# Patient Record
Sex: MALE | Race: WHITE | NOT HISPANIC OR LATINO | Employment: FULL TIME | URBAN - METROPOLITAN AREA
[De-identification: names, ages, dates, MRNs, and addresses within clinical notes are randomized per-mention and may not be internally consistent; named-entity substitution may affect disease eponyms.]

---

## 2023-07-19 LAB
ALANINE AMINOTRANSFERASE (SGPT) (U/L) IN SER/PLAS: 51 U/L (ref 10–52)
ALBUMIN (G/DL) IN SER/PLAS: 4.5 G/DL (ref 3.4–5)
ALKALINE PHOSPHATASE (U/L) IN SER/PLAS: 61 U/L (ref 33–136)
ANION GAP IN SER/PLAS: 10 MMOL/L (ref 10–20)
ASPARTATE AMINOTRANSFERASE (SGOT) (U/L) IN SER/PLAS: 39 U/L (ref 9–39)
BILIRUBIN TOTAL (MG/DL) IN SER/PLAS: 1.1 MG/DL (ref 0–1.2)
CALCIUM (MG/DL) IN SER/PLAS: 9.8 MG/DL (ref 8.6–10.3)
CARBON DIOXIDE, TOTAL (MMOL/L) IN SER/PLAS: 32 MMOL/L (ref 21–32)
CHLORIDE (MMOL/L) IN SER/PLAS: 104 MMOL/L (ref 98–107)
CHOLESTEROL (MG/DL) IN SER/PLAS: 153 MG/DL (ref 0–199)
CHOLESTEROL IN HDL (MG/DL) IN SER/PLAS: 55.8 MG/DL
CHOLESTEROL/HDL RATIO: 2.7
CREATININE (MG/DL) IN SER/PLAS: 1.09 MG/DL (ref 0.5–1.3)
GFR MALE: 77 ML/MIN/1.73M2
GLUCOSE (MG/DL) IN SER/PLAS: 110 MG/DL (ref 74–99)
LDL: 80 MG/DL (ref 0–99)
POTASSIUM (MMOL/L) IN SER/PLAS: 4.3 MMOL/L (ref 3.5–5.3)
PROSTATE SPECIFIC AG (NG/ML) IN SER/PLAS: 1.02 NG/ML (ref 0–4)
PROTEIN TOTAL: 7.2 G/DL (ref 6.4–8.2)
SODIUM (MMOL/L) IN SER/PLAS: 142 MMOL/L (ref 136–145)
TRIGLYCERIDE (MG/DL) IN SER/PLAS: 88 MG/DL (ref 0–149)
UREA NITROGEN (MG/DL) IN SER/PLAS: 14 MG/DL (ref 6–23)
VLDL: 18 MG/DL (ref 0–40)

## 2023-07-25 ENCOUNTER — PATIENT MESSAGE (OUTPATIENT)
Dept: PRIMARY CARE | Facility: CLINIC | Age: 63
End: 2023-07-25
Payer: COMMERCIAL

## 2023-09-29 ENCOUNTER — HOSPITAL ENCOUNTER (OUTPATIENT)
Facility: HOSPITAL | Age: 63
Setting detail: OUTPATIENT SURGERY
End: 2023-09-29
Attending: SURGERY | Admitting: SURGERY
Payer: COMMERCIAL

## 2023-10-05 ENCOUNTER — APPOINTMENT (OUTPATIENT)
Dept: PRIMARY CARE | Facility: CLINIC | Age: 63
End: 2023-10-05
Payer: COMMERCIAL

## 2023-10-07 PROBLEM — R10.9 ABDOMINAL PAIN: Status: ACTIVE | Noted: 2023-10-07

## 2023-10-07 PROBLEM — E78.5 HYPERLIPIDEMIA: Status: ACTIVE | Noted: 2023-10-07

## 2023-10-07 PROBLEM — D22.5 MELANOCYTIC NEVI OF TRUNK: Status: ACTIVE | Noted: 2023-03-30

## 2023-10-07 PROBLEM — L71.9 ROSACEA, UNSPECIFIED: Status: ACTIVE | Noted: 2023-03-30

## 2023-10-07 PROBLEM — J32.2 CHRONIC ETHMOIDAL SINUSITIS: Status: ACTIVE | Noted: 2023-10-07

## 2023-10-07 PROBLEM — R53.83 FATIGUE: Status: ACTIVE | Noted: 2023-10-07

## 2023-10-07 PROBLEM — D22.70 MELANOCYTIC NEVI OF UNSPECIFIED LOWER LIMB, INCLUDING HIP: Status: ACTIVE | Noted: 2023-03-30

## 2023-10-07 PROBLEM — J34.89 RHINORRHEA: Status: ACTIVE | Noted: 2023-10-07

## 2023-10-07 PROBLEM — L57.9 SKIN CHANGES DUE TO CHRONIC EXPOSURE TO NONIONIZING RADIATION, UNSPECIFIED: Status: ACTIVE | Noted: 2023-03-30

## 2023-10-07 PROBLEM — R14.0 BLOATING: Status: ACTIVE | Noted: 2023-10-07

## 2023-10-07 PROBLEM — L82.1 OTHER SEBORRHEIC KERATOSIS: Status: ACTIVE | Noted: 2023-03-30

## 2023-10-07 PROBLEM — K21.9 GERD (GASTROESOPHAGEAL REFLUX DISEASE): Status: ACTIVE | Noted: 2023-10-07

## 2023-10-07 PROBLEM — D22.39 MELANOCYTIC NEVI OF OTHER PARTS OF FACE: Status: ACTIVE | Noted: 2023-03-30

## 2023-10-07 PROBLEM — M76.62 ACHILLES TENDINITIS OF LEFT LOWER EXTREMITY: Status: ACTIVE | Noted: 2023-10-07

## 2023-10-07 PROBLEM — K22.70 BARRETT'S ESOPHAGUS DETERMINED BY BIOPSY: Status: ACTIVE | Noted: 2023-10-07

## 2023-10-07 PROBLEM — R44.8 FACIAL PRESSURE: Status: ACTIVE | Noted: 2023-10-07

## 2023-10-07 PROBLEM — D22.4 MELANOCYTIC NEVI OF SCALP AND NECK: Status: ACTIVE | Noted: 2023-03-30

## 2023-10-07 PROBLEM — R00.0 SINUS TACHYCARDIA: Status: ACTIVE | Noted: 2023-10-07

## 2023-10-07 PROBLEM — D22.60 MELANOCYTIC NEVI OF UNSPECIFIED UPPER LIMB, INCLUDING SHOULDER: Status: ACTIVE | Noted: 2023-03-30

## 2023-10-07 PROBLEM — D69.3 CHRONIC ITP (IDIOPATHIC THROMBOCYTOPENIA) (MULTI): Status: ACTIVE | Noted: 2023-10-07

## 2023-10-07 PROBLEM — I25.10 CORONARY ARTERY DISEASE: Status: ACTIVE | Noted: 2023-10-07

## 2023-10-07 PROBLEM — R43.8 DECREASED SENSE OF SMELL: Status: ACTIVE | Noted: 2023-10-07

## 2023-10-07 PROBLEM — R09.89 THROAT CLEARING: Status: ACTIVE | Noted: 2023-10-07

## 2023-10-07 PROBLEM — R94.31 ABNORMAL EKG: Status: ACTIVE | Noted: 2023-10-07

## 2023-10-07 PROBLEM — D48.5 NEOPLASM OF UNCERTAIN BEHAVIOR OF SKIN: Status: ACTIVE | Noted: 2023-03-30

## 2023-10-07 PROBLEM — R26.89 ANTALGIC GAIT: Status: ACTIVE | Noted: 2023-10-07

## 2023-10-07 PROBLEM — L71.0 PERIORAL DERMATITIS: Status: ACTIVE | Noted: 2023-03-30

## 2023-10-07 RX ORDER — OMEPRAZOLE 40 MG/1
1 CAPSULE, DELAYED RELEASE ORAL 2 TIMES DAILY
COMMUNITY
Start: 2023-03-23 | End: 2024-01-19 | Stop reason: HOSPADM

## 2023-10-07 RX ORDER — METRONIDAZOLE 7.5 MG/G
1 CREAM TOPICAL 2 TIMES DAILY
COMMUNITY
Start: 2023-03-30 | End: 2023-11-01

## 2023-10-07 RX ORDER — GUAIFENESIN AND PHENYLEPHRINE HCL 400; 10 MG/1; MG/1
1 TABLET ORAL DAILY
COMMUNITY
Start: 2019-12-05 | End: 2023-11-01

## 2023-10-07 RX ORDER — PANTOPRAZOLE SODIUM 40 MG/1
40 TABLET, DELAYED RELEASE ORAL DAILY
COMMUNITY
Start: 2023-02-03 | End: 2023-11-01

## 2023-10-07 RX ORDER — ZINC SULFATE 50(220)MG
CAPSULE ORAL
COMMUNITY

## 2023-10-07 RX ORDER — SODIUM, POTASSIUM,MAG SULFATES 17.5-3.13G
SOLUTION, RECONSTITUTED, ORAL ORAL
COMMUNITY
Start: 2022-02-24 | End: 2023-11-01

## 2023-10-07 RX ORDER — LORATADINE 10 MG/1
CAPSULE, LIQUID FILLED ORAL
COMMUNITY
Start: 2022-12-16 | End: 2024-01-16 | Stop reason: ALTCHOICE

## 2023-10-07 RX ORDER — ATORVASTATIN CALCIUM 10 MG/1
1 TABLET, FILM COATED ORAL
COMMUNITY
Start: 2018-04-23 | End: 2023-10-30 | Stop reason: SDUPTHER

## 2023-10-07 RX ORDER — LANOLIN ALCOHOL/MO/W.PET/CERES
CREAM (GRAM) TOPICAL
COMMUNITY
Start: 2019-12-05

## 2023-10-07 RX ORDER — AMOXICILLIN AND CLAVULANATE POTASSIUM 875; 125 MG/1; MG/1
1 TABLET, FILM COATED ORAL 2 TIMES DAILY
COMMUNITY
End: 2023-11-01

## 2023-10-07 RX ORDER — DICYCLOMINE HYDROCHLORIDE 10 MG/1
10 CAPSULE ORAL EVERY 6 HOURS PRN
COMMUNITY
Start: 2023-02-06 | End: 2023-11-01

## 2023-10-07 RX ORDER — CHOLECALCIFEROL (VITAMIN D3) 125 MCG
CAPSULE ORAL
COMMUNITY
Start: 2019-12-05

## 2023-10-09 ENCOUNTER — CLINICAL SUPPORT (OUTPATIENT)
Dept: ALLERGY | Facility: CLINIC | Age: 63
End: 2023-10-09
Payer: COMMERCIAL

## 2023-10-09 DIAGNOSIS — J30.9 ALLERGIC RHINITIS, UNSPECIFIED SEASONALITY, UNSPECIFIED TRIGGER: ICD-10-CM

## 2023-10-09 PROCEDURE — 95115 IMMUNOTHERAPY ONE INJECTION: CPT | Performed by: ALLERGY & IMMUNOLOGY

## 2023-10-23 ENCOUNTER — APPOINTMENT (OUTPATIENT)
Dept: ALLERGY | Facility: CLINIC | Age: 63
End: 2023-10-23
Payer: COMMERCIAL

## 2023-10-27 ENCOUNTER — TELEPHONE (OUTPATIENT)
Dept: PRIMARY CARE | Facility: CLINIC | Age: 63
End: 2023-10-27
Payer: COMMERCIAL

## 2023-10-27 NOTE — TELEPHONE ENCOUNTER
Pt was your pt. Years ago & is coming as npv in January.  Has sinus problems  & wondered if you could get him in earlier.  Please advise  Ty

## 2023-10-28 ENCOUNTER — TELEPHONE (OUTPATIENT)
Dept: SURGERY | Facility: CLINIC | Age: 63
End: 2023-10-28
Payer: COMMERCIAL

## 2023-10-28 NOTE — TELEPHONE ENCOUNTER
GEN SURG: Called received VM, apologized for delay in returning call - asked to call me back r/t his statement that has couple questions to speak with Dr. Roth about prior to scheduled LINX 11/30. Will determine if needs pre-op FUV with Dr. Roth or if question(s) can be answered via phone call with LPN or Fellow. *Pending return call from jean carlos Hardy my direct number:  [phone].

## 2023-10-30 ENCOUNTER — TELEPHONE (OUTPATIENT)
Dept: ALLERGY | Facility: CLINIC | Age: 63
End: 2023-10-30
Payer: COMMERCIAL

## 2023-10-30 ENCOUNTER — PATIENT MESSAGE (OUTPATIENT)
Dept: ALLERGY | Facility: CLINIC | Age: 63
End: 2023-10-30
Payer: COMMERCIAL

## 2023-10-30 DIAGNOSIS — D22.4 MELANOCYTIC NEVI OF SCALP AND NECK: ICD-10-CM

## 2023-10-30 DIAGNOSIS — E78.5 HYPERLIPIDEMIA, UNSPECIFIED HYPERLIPIDEMIA TYPE: ICD-10-CM

## 2023-10-30 RX ORDER — ATORVASTATIN CALCIUM 10 MG/1
10 TABLET, FILM COATED ORAL
Qty: 36 TABLET | Refills: 3 | Status: SHIPPED | OUTPATIENT
Start: 2023-10-30 | End: 2024-05-08 | Stop reason: ALTCHOICE

## 2023-11-01 ENCOUNTER — OFFICE VISIT (OUTPATIENT)
Dept: PRIMARY CARE | Facility: CLINIC | Age: 63
End: 2023-11-01
Payer: COMMERCIAL

## 2023-11-01 VITALS
TEMPERATURE: 96.9 F | BODY MASS INDEX: 27.68 KG/M2 | OXYGEN SATURATION: 97 % | WEIGHT: 204.4 LBS | SYSTOLIC BLOOD PRESSURE: 120 MMHG | HEIGHT: 72 IN | HEART RATE: 74 BPM | DIASTOLIC BLOOD PRESSURE: 72 MMHG

## 2023-11-01 DIAGNOSIS — E78.5 HYPERLIPIDEMIA, UNSPECIFIED HYPERLIPIDEMIA TYPE: ICD-10-CM

## 2023-11-01 DIAGNOSIS — I25.10 CORONARY ARTERY DISEASE INVOLVING NATIVE HEART WITHOUT ANGINA PECTORIS, UNSPECIFIED VESSEL OR LESION TYPE: ICD-10-CM

## 2023-11-01 DIAGNOSIS — D69.3 CHRONIC ITP (IDIOPATHIC THROMBOCYTOPENIA) (MULTI): ICD-10-CM

## 2023-11-01 DIAGNOSIS — K21.9 GASTROESOPHAGEAL REFLUX DISEASE, UNSPECIFIED WHETHER ESOPHAGITIS PRESENT: ICD-10-CM

## 2023-11-01 DIAGNOSIS — J01.90 ACUTE SINUSITIS, RECURRENCE NOT SPECIFIED, UNSPECIFIED LOCATION: Primary | ICD-10-CM

## 2023-11-01 PROCEDURE — 1036F TOBACCO NON-USER: CPT | Performed by: FAMILY MEDICINE

## 2023-11-01 PROCEDURE — 99214 OFFICE O/P EST MOD 30 MIN: CPT | Performed by: FAMILY MEDICINE

## 2023-11-01 RX ORDER — LEVOFLOXACIN 500 MG/1
500 TABLET, FILM COATED ORAL DAILY
Qty: 10 TABLET | Refills: 0 | Status: SHIPPED | OUTPATIENT
Start: 2023-11-01 | End: 2023-11-11

## 2023-11-01 RX ORDER — FLUTICASONE PROPIONATE 50 MCG
1 SPRAY, SUSPENSION (ML) NASAL DAILY
COMMUNITY
End: 2023-12-05

## 2023-11-01 RX ORDER — PREDNISONE 20 MG/1
20 TABLET ORAL DAILY
Qty: 7 TABLET | Refills: 0 | Status: SHIPPED | OUTPATIENT
Start: 2023-11-01 | End: 2023-11-08

## 2023-11-01 ASSESSMENT — ENCOUNTER SYMPTOMS: DEPRESSION: 0

## 2023-11-01 ASSESSMENT — PAIN SCALES - GENERAL: PAINLEVEL: 2

## 2023-11-01 NOTE — PROGRESS NOTES
"Subjective   Patient ID: Antony Pizarro is a 63 y.o. male who presents for New Patient Visit (Establish care with pcp, pt is not fasting. ) and Sinus Problem (Sinus concerns for 3 months now. Medication are not helping. ).    HPI   July 4th was at [facility]. Treated with amox/clav for sinus infx.  Took 2 courses, 10 days each, would get better then return.  Still with sinus drainage.  Having LINX for GERD at end of month.    Review of Systems  Cardiovascular: no chest pain, no edema, no palpitations, and no syncope.   Respiratory: no cough,no shortness of breath, and no wheezing  Gastrointestinal: no abdominal pain, nausea, vomiting or blood in stools  Genitourinary: no dysuria or hematuria  Objective   /72 (BP Location: Left arm, Patient Position: Sitting, BP Cuff Size: Adult)   Pulse 74   Temp 36.1 °C (96.9 °F) (Temporal)   Ht 1.818 m (5' 11.58\")   Wt 92.7 kg (204 lb 6.4 oz)   SpO2 97%   BMI 28.05 kg/m²     Physical Exam  Alert, pleasant and in no acute distress.  HEENT: Normocephalic, atraumatic.  Ears: Canals clear.  Tympanic membranes intact and pearly gray.  Nose: Nares reveal mildly inflamed turbinates.  Mouth: No mucosal lesions noted.  No pharyngeal erythema.  Neck: Supple.  No palpable lymphadenopathy.  No JVD or bruit  Heart: Regular rate and rhythm without murmur  Lungs: Clear to auscultation    Assessment/Plan   Problem List Items Addressed This Visit             ICD-10-CM       Cardiac and Vasculature    Coronary artery disease I25.10    Hyperlipidemia E78.5       Coag and Thromboembolic    Chronic ITP (idiopathic thrombocytopenia) (CMS/[facility]) D69.3       Gastrointestinal and Abdominal    GERD (gastroesophageal reflux disease) K21.9     Other Visit Diagnoses         Codes    Acute sinusitis, recurrence not specified, unspecified location    -  Primary J01.90    Relevant Medications    levoFLOXacin (Levaquin) 500 mg tablet    predniSONE (Deltasone) 20 mg tablet        Patient here to " establish.  He is a former patient.  Medical records reviewed.  1.  Acute sinusitis: Patient with a persisting sinusitis.  Antibiotics seem to give him relief but slowly things recur.  We will treat with 10 days of Levaquin along with 7 days of prednisone.  If things persist or recur, would refer to ENT and get a CT of the sinus.  2.  CAD/HLD: Under good control  3.  Chronic ITP: Stable  4.  GERD: Patient planning Lynx procedure at the end of the month.

## 2023-11-03 DIAGNOSIS — K21.9 GASTROESOPHAGEAL REFLUX DISEASE, UNSPECIFIED WHETHER ESOPHAGITIS PRESENT: ICD-10-CM

## 2023-11-03 PROBLEM — Z01.818 PREOPERATIVE CLEARANCE: Status: ACTIVE | Noted: 2023-11-03

## 2023-11-03 NOTE — PROGRESS NOTES
Client scheduled for Lap HHR, Poss. LES Augmentation & EGD on 11/30/23 - Dr. Roth orders following Pre-Op:  Labs - Chest Xray - EKG (Last Completed 5/11/23 results in Epic).  Orders entered & may be completed at any time from now up until 2 weeks prior to scheduled surgical  procedure.  Also clearance notes from Cardiology and Primary Care Provider stating medically cleared to procedure with surgery.

## 2023-11-06 ENCOUNTER — CLINICAL SUPPORT (OUTPATIENT)
Dept: ALLERGY | Facility: CLINIC | Age: 63
End: 2023-11-06
Payer: COMMERCIAL

## 2023-11-06 DIAGNOSIS — J30.9 ALLERGIC RHINITIS, UNSPECIFIED SEASONALITY, UNSPECIFIED TRIGGER: ICD-10-CM

## 2023-11-06 PROCEDURE — 95115 IMMUNOTHERAPY ONE INJECTION: CPT | Performed by: ALLERGY & IMMUNOLOGY

## 2023-11-21 ENCOUNTER — TELEPHONE (OUTPATIENT)
Dept: PRIMARY CARE | Facility: CLINIC | Age: 63
End: 2023-11-21
Payer: COMMERCIAL

## 2023-11-21 DIAGNOSIS — H92.09 OTALGIA, UNSPECIFIED LATERALITY: Primary | ICD-10-CM

## 2023-11-21 NOTE — TELEPHONE ENCOUNTER
Pt. Was in & took meds to treat ear drainage.  Still having problems.  Could he please have referral to ent?  Please advise  Ty

## 2023-11-22 DIAGNOSIS — J01.90 ACUTE SINUSITIS, RECURRENCE NOT SPECIFIED, UNSPECIFIED LOCATION: Primary | ICD-10-CM

## 2023-11-22 RX ORDER — CEPHALEXIN 500 MG/1
500 CAPSULE ORAL 3 TIMES DAILY
Qty: 21 CAPSULE | Refills: 0 | Status: SHIPPED | OUTPATIENT
Start: 2023-11-22 | End: 2023-11-29

## 2023-11-28 ENCOUNTER — ANCILLARY ORDERS (OUTPATIENT)
Dept: SURGERY | Facility: CLINIC | Age: 63
End: 2023-11-28

## 2023-11-28 ENCOUNTER — ANCILLARY PROCEDURE (OUTPATIENT)
Dept: RADIOLOGY | Facility: CLINIC | Age: 63
End: 2023-11-28
Payer: COMMERCIAL

## 2023-11-28 DIAGNOSIS — K21.9 GASTROESOPHAGEAL REFLUX DISEASE, UNSPECIFIED WHETHER ESOPHAGITIS PRESENT: ICD-10-CM

## 2023-11-28 PROCEDURE — 71046 X-RAY EXAM CHEST 2 VIEWS: CPT | Mod: FY

## 2023-11-28 PROCEDURE — 95165 ANTIGEN THERAPY SERVICES: CPT | Performed by: ALLERGY & IMMUNOLOGY

## 2023-11-28 PROCEDURE — 71046 X-RAY EXAM CHEST 2 VIEWS: CPT | Performed by: RADIOLOGY

## 2023-11-29 ENCOUNTER — ANESTHESIA EVENT (OUTPATIENT)
Dept: OPERATING ROOM | Facility: HOSPITAL | Age: 63
End: 2023-11-29

## 2023-11-29 RX ORDER — DROPERIDOL 2.5 MG/ML
0.62 INJECTION, SOLUTION INTRAMUSCULAR; INTRAVENOUS ONCE AS NEEDED
Status: CANCELLED | OUTPATIENT
Start: 2023-11-29

## 2023-11-29 RX ORDER — SODIUM CHLORIDE, SODIUM LACTATE, POTASSIUM CHLORIDE, CALCIUM CHLORIDE 600; 310; 30; 20 MG/100ML; MG/100ML; MG/100ML; MG/100ML
100 INJECTION, SOLUTION INTRAVENOUS CONTINUOUS
Status: CANCELLED | OUTPATIENT
Start: 2023-11-29

## 2023-11-29 RX ORDER — ONDANSETRON HYDROCHLORIDE 2 MG/ML
4 INJECTION, SOLUTION INTRAVENOUS ONCE AS NEEDED
Status: CANCELLED | OUTPATIENT
Start: 2023-11-29

## 2023-11-29 RX ORDER — OXYCODONE HYDROCHLORIDE 5 MG/1
5 TABLET ORAL EVERY 4 HOURS PRN
Status: CANCELLED | OUTPATIENT
Start: 2023-11-29

## 2023-11-30 ENCOUNTER — ANESTHESIA (OUTPATIENT)
Dept: OPERATING ROOM | Facility: HOSPITAL | Age: 63
End: 2023-11-30
Payer: COMMERCIAL

## 2023-11-30 ENCOUNTER — EDUCATION (OUTPATIENT)
Dept: ALLERGY | Facility: CLINIC | Age: 63
End: 2023-11-30
Payer: COMMERCIAL

## 2023-11-30 DIAGNOSIS — J30.9 ALLERGIC RHINITIS, UNSPECIFIED SEASONALITY, UNSPECIFIED TRIGGER: Primary | ICD-10-CM

## 2023-12-05 ENCOUNTER — APPOINTMENT (OUTPATIENT)
Dept: ALLERGY | Facility: CLINIC | Age: 63
End: 2023-12-05
Payer: COMMERCIAL

## 2023-12-05 ENCOUNTER — OFFICE VISIT (OUTPATIENT)
Dept: OTOLARYNGOLOGY | Facility: CLINIC | Age: 63
End: 2023-12-05
Payer: COMMERCIAL

## 2023-12-05 VITALS
TEMPERATURE: 97.3 F | WEIGHT: 204 LBS | BODY MASS INDEX: 27.63 KG/M2 | HEIGHT: 72 IN | DIASTOLIC BLOOD PRESSURE: 78 MMHG | HEART RATE: 75 BPM | SYSTOLIC BLOOD PRESSURE: 137 MMHG

## 2023-12-05 DIAGNOSIS — J32.4 CHRONIC PANSINUSITIS: ICD-10-CM

## 2023-12-05 DIAGNOSIS — H92.09 OTALGIA, UNSPECIFIED LATERALITY: ICD-10-CM

## 2023-12-05 DIAGNOSIS — J30.89 ALLERGIC RHINITIS DUE TO OTHER ALLERGIC TRIGGER, UNSPECIFIED SEASONALITY: ICD-10-CM

## 2023-12-05 DIAGNOSIS — J34.89 RHINORRHEA: ICD-10-CM

## 2023-12-05 PROCEDURE — 1036F TOBACCO NON-USER: CPT | Performed by: STUDENT IN AN ORGANIZED HEALTH CARE EDUCATION/TRAINING PROGRAM

## 2023-12-05 PROCEDURE — 31231 NASAL ENDOSCOPY DX: CPT | Performed by: STUDENT IN AN ORGANIZED HEALTH CARE EDUCATION/TRAINING PROGRAM

## 2023-12-05 PROCEDURE — 99213 OFFICE O/P EST LOW 20 MIN: CPT | Performed by: STUDENT IN AN ORGANIZED HEALTH CARE EDUCATION/TRAINING PROGRAM

## 2023-12-05 PROCEDURE — 99203 OFFICE O/P NEW LOW 30 MIN: CPT | Performed by: STUDENT IN AN ORGANIZED HEALTH CARE EDUCATION/TRAINING PROGRAM

## 2023-12-05 RX ORDER — FLUTICASONE PROPIONATE 50 MCG
1 SPRAY, SUSPENSION (ML) NASAL 2 TIMES DAILY
Qty: 16 G | Refills: 6 | Status: SHIPPED | OUTPATIENT
Start: 2023-12-05 | End: 2024-12-04

## 2023-12-05 RX ORDER — AZELASTINE 1 MG/ML
1 SPRAY, METERED NASAL 2 TIMES DAILY
Qty: 30 ML | Refills: 6 | Status: SHIPPED | OUTPATIENT
Start: 2023-12-05 | End: 2024-12-04

## 2023-12-05 ASSESSMENT — COLUMBIA-SUICIDE SEVERITY RATING SCALE - C-SSRS
6. HAVE YOU EVER DONE ANYTHING, STARTED TO DO ANYTHING, OR PREPARED TO DO ANYTHING TO END YOUR LIFE?: NO
2. HAVE YOU ACTUALLY HAD ANY THOUGHTS OF KILLING YOURSELF?: NO
1. IN THE PAST MONTH, HAVE YOU WISHED YOU WERE DEAD OR WISHED YOU COULD GO TO SLEEP AND NOT WAKE UP?: NO

## 2023-12-05 ASSESSMENT — PATIENT HEALTH QUESTIONNAIRE - PHQ9
1. LITTLE INTEREST OR PLEASURE IN DOING THINGS: NOT AT ALL
2. FEELING DOWN, DEPRESSED OR HOPELESS: NOT AT ALL
SUM OF ALL RESPONSES TO PHQ9 QUESTIONS 1 AND 2: 0

## 2023-12-05 ASSESSMENT — PAIN SCALES - GENERAL: PAINLEVEL: 0-NO PAIN

## 2023-12-05 NOTE — PROGRESS NOTES
"SUBJECTIVE  Patient ID: Antony Pizarro is a 63 y.o. male who presents for New Patient Visit (Possible sinus infection).    He reports longstanding sinus issues. Last had surgery 2020 with my colleague, Dr. Moore.  Reports that after surgery he continued to have symptoms which she treated with multiple different medications.  Currently working with allergy and has noted some \"marginal\" improvement with treatment of dust mite IT.  Previously had workup for possible immune deficiency which was normal.    Caught an infection July 2023. Was then treated with a string of antibiotics (now been on 5 courses of antibiotics - cephalexin, Levaquin, amoxicillin-clav; no steroids - didn't take because of active levofloxacin prescription). Not currently on any nasal sprays was previously on generic Flonase. Notes some improvement in nasal discharge while on antibiotics; then thicker discharge comes up. Had sun sensitivity with doxycycline. Irrigates frequently.     They endorse nasal congestion, nasal obstruction, nasal drainage, and facial pressure. Nasal obstruction is worse at night. Reports very poor sense of smell. They deny epistaxis. Had some dry blood while on Keflex recently. History of trauma playing football with broken nose; had reduction and septoplasty. Previously tried saline spray, saline irrigations, steroid spray (Flonase, Ryaltris), antihistamine spray, oral antihistamine (Claritin), and montelukast.     Has a history of significant GERD; gets a \"lot of regurgitation.\" Trying a new LINK procedure soon (had to cancel planned procedure secondary to the influenza).    Review of Systems  Complete ROS negative except as noted above or on patient intake form and as above.    OBJECTIVE  Physical Exam  CONSTITUTIONAL: Well appearing male who appears stated age.  PSYCHIATRIC: Alert, appropriate mood and affect.  RESPIRATORY: Normal inspiration and expiration and chest wall expansion; no use of accessory muscles to " breathe.  VOICE: Clear speech without hoarseness. No stridor nor stertor.  HEAD, FACE, AND SKIN: Symmetric facial feature. No cutaneous masses or lesions were visualized. The parotid and submandibular glands were normal to palpation.  EYES: Pupils were equal in size and reactive to light. Extra-ocular muscle function was intact. No nystagmus was observed. Vision was grossly intact.  EARS: External ears were normally formed with no lesions. The external auditory canals were clear. The tympanic membranes were intact and in the neutral position. No significant retraction pockets nor effusions were appreciated.  NOSE: Nasal dorsum was midline. Anterior rhinoscopy demonstrated a septum relatively midline. Inferior turbinates were mildly hypertrophied. No obvious nasal masses, polyps, mucopurulence, nor other lesions were appreciated.  ORAL CAVITY: Lips were without lesions. Moist mucous membranes. No lesions appreciated along the gingiva, oral mucosa, nor tongue.  DENTITION: Grossly normal without obvious infection nor inflammation.  OROPHARYNX: No lesion nor mucosal abnormality. The uvula was normal appearing. The tonsils were surgically absent.  NECK: Visualization and palpation of the neck revealed no mass lesions, no thyromegaly or thyroid masses. No cutaneous lesions appreciated.  LYMPHATICS (CERVICAL): There were no palpable lymph nodes in the posterior triangle, submandibular triangle, jugulodigastric region, nor central neck.  NEUROLOGIC: Cranial nerves III, IV, and VI were noted to be intact via extra-ocular muscle movement testing. Cranial nerve V was noted to be intact to soft touch bilaterally. Cranial nerve VII was noted to be intact and symmetric by facial movement. Cranial nerve VIII was tested with normal voice examination and revealed grossly normal hearing. Cranial nerves IX and X noted to be intact by palatal movement. Cranial nerve XI noted to be intact via shoulder shrug.  Cranial nerve XII noted to  be intact with active and symmetric tongue movement.      Radiology  CT sinus 2019: I personally reviewed the patient's prior imaging.  This demonstrates evidence of limited sinus surgery involving the right maxillary sinus and anterior ethmoid air cells.  There was varying levels of mucosal thickening involving the paranasal sinuses.    --------------------------------------------------  Procedure: Nasal endoscopy - Diagnostic  Indication: Chronic rhinitis, concern for chronic sinusitis  Informed consent verbally obtained: The risks, benefits, alternatives, and expectations were discussed with the patient, who wished to proceed  Anesthesia: Topical lidocaine/oxymetazoline    Findings: After topical anesthetic was applied the nasal cavities were examined with a flexible endoscope. The septum was relatively midline.  - Right: The inferior turbinate was moderately hypertrophied.  Clear drainage appreciated tracking along the inferior meatus to the nasopharynx.  The middle turbinate appeared healthy.  There is evidence of prior widely patent maxillary antrostomy with questionable scarring along the floor/anterior wall.  The ethmoid sinuses are clear to the skull base with widely patent frontal sinus outflow tract.  The sphenoid sinus is widely patent.  - Left: The inferior turbinate was moderately hypertrophied.  Thicker mucoid drainage appreciated tracking along the inferior meatus to the nasopharynx.  The middle turbinate appeared healthy.  There is evidence of prior surgery with widely patent maxillary antrostomy, ethmoid sinuses, sphenoid os, and frontal sinus outflow tract.    There is bilateral symmetric true vocal fold motion.  No pharyngeal/pharyngeal lesions nor masses.    There were no complications and the patient tolerated the procedure well.  --------------------------------------------------    ASSESSMENT/PLAN  Diagnoses and all orders for this visit:  Otalgia, unspecified laterality  -     Referral to  ENT  Rhinorrhea  Allergic rhinitis due to other allergic trigger, unspecified seasonality  -     azelastine (Astelin) 137 mcg (0.1 %) nasal spray; Administer 1 spray into each nostril 2 times a day. Use in each nostril as directed  Chronic pansinusitis  -     CT sinuss wo IV contrast volumetric surgical planning; Future  -     fluticasone (Flonase) 50 mcg/actuation nasal spray; Administer 1 spray into each nostril 2 times a day. Shake gently. Before first use, prime pump. After use, clean tip and replace cap.    63 y.o. male with longstanding nasal and sinus concerns partially secondary to allergic rhinitis.    1.  Chronic/allergic rhinitis, history of chronic sinusitis, nasal drainage  The patient reports longstanding difficulty with sinus disease.  He has a history of dust mite allergy and is currently getting immunotherapy.  However, over the last half year he has had increasing thick nasal drainage that has not responded well to multiple rounds of antibiotics.    Endoscopy: Evidence of prior surgery fully healed.  Questionable scarring within the right maxillary sinus.  Thicker drainage within the nasal cavity but no evidence of sinus infection.  Imaging: Ordered    Given how well the patient appears today on endoscopy, I recommended that he restart medical therapy with his irrigations and see if there is any improvement.  I suspect that much of his symptoms are secondary to his allergic rhinitis which I do not believe that we will be able to control surgically.  I am slightly concerned that there may be some scarring within the right maxillary sinus and given his multiple rounds of antibiotics I recommended he obtain a CT sinus to better evaluate this.  I will call him with the results.  In the meantime he was amenable to retrialing medical therapy.  He will continue work with allergy.    This note was created using speech recognition transcription software. Despite proofreading, typographical errors may be  present that affect the meaning of the content. Please contact my office with any questions.

## 2023-12-05 NOTE — LETTER
"December 5, 2023     Ronnie Cardoza DO  [address]  [address]  [facility] [address]    Patient: Antony Pizarro   YOB: 1960   Date of Visit: 12/5/2023       Dear Dr. Ronnie Cardoza DO:    Thank you for referring Antony Pizarro to me for evaluation. Below are my notes for this consultation.  If you have questions, please do not hesitate to call me. I look forward to following your patient along with you.       Sincerely,     Preston De Leon MD      CC: Ramila Dwyer MD  ______________________________________________________________________________________    SUBJECTIVE  Patient ID: Antony Pizarro is a 63 y.o. male who presents for New Patient Visit (Possible sinus infection).    He reports longstanding sinus issues. Last had surgery 2020 with my colleague, Dr. Moore.  Reports that after surgery he continued to have symptoms which she treated with multiple different medications.  Currently working with allergy and has noted some \"marginal\" improvement with treatment of dust mite IT.  Previously had workup for possible immune deficiency which was normal.    Caught an infection July 2023. Was then treated with a string of antibiotics (now been on 5 courses of antibiotics - cephalexin, Levaquin, amoxicillin-clav; no steroids - didn't take because of active levofloxacin prescription). Not currently on any nasal sprays was previously on generic Flonase. Notes some improvement in nasal discharge while on antibiotics; then thicker discharge comes up. Had sun sensitivity with doxycycline. Irrigates frequently.     They endorse nasal congestion, nasal obstruction, nasal drainage, and facial pressure. Nasal obstruction is worse at night. Reports very poor sense of smell. They deny epistaxis. Had some dry blood while on Keflex recently. History of trauma playing football with broken nose; had reduction and septoplasty. Previously tried saline spray, saline irrigations, steroid " "spray (Flonase, Ryaltris), antihistamine spray, oral antihistamine (Claritin), and montelukast.     Has a history of significant GERD; gets a \"lot of regurgitation.\" Trying a new LINK procedure soon (had to cancel planned procedure secondary to the influenza).    Review of Systems  Complete ROS negative except as noted above or on patient intake form and as above.    OBJECTIVE  Physical Exam  CONSTITUTIONAL: Well appearing male who appears stated age.  PSYCHIATRIC: Alert, appropriate mood and affect.  RESPIRATORY: Normal inspiration and expiration and chest wall expansion; no use of accessory muscles to breathe.  VOICE: Clear speech without hoarseness. No stridor nor stertor.  HEAD, FACE, AND SKIN: Symmetric facial feature. No cutaneous masses or lesions were visualized. The parotid and submandibular glands were normal to palpation.  EYES: Pupils were equal in size and reactive to light. Extra-ocular muscle function was intact. No nystagmus was observed. Vision was grossly intact.  EARS: External ears were normally formed with no lesions. The external auditory canals were clear. The tympanic membranes were intact and in the neutral position. No significant retraction pockets nor effusions were appreciated.  NOSE: Nasal dorsum was midline. Anterior rhinoscopy demonstrated a septum relatively midline. Inferior turbinates were mildly hypertrophied. No obvious nasal masses, polyps, mucopurulence, nor other lesions were appreciated.  ORAL CAVITY: Lips were without lesions. Moist mucous membranes. No lesions appreciated along the gingiva, oral mucosa, nor tongue.  DENTITION: Grossly normal without obvious infection nor inflammation.  OROPHARYNX: No lesion nor mucosal abnormality. The uvula was normal appearing. The tonsils were surgically absent.  NECK: Visualization and palpation of the neck revealed no mass lesions, no thyromegaly or thyroid masses. No cutaneous lesions appreciated.  LYMPHATICS (CERVICAL): There were no " palpable lymph nodes in the posterior triangle, submandibular triangle, jugulodigastric region, nor central neck.  NEUROLOGIC: Cranial nerves III, IV, and VI were noted to be intact via extra-ocular muscle movement testing. Cranial nerve V was noted to be intact to soft touch bilaterally. Cranial nerve VII was noted to be intact and symmetric by facial movement. Cranial nerve VIII was tested with normal voice examination and revealed grossly normal hearing. Cranial nerves IX and X noted to be intact by palatal movement. Cranial nerve XI noted to be intact via shoulder shrug.  Cranial nerve XII noted to be intact with active and symmetric tongue movement.      Radiology  CT sinus 2019: I personally reviewed the patient's prior imaging.  This demonstrates evidence of limited sinus surgery involving the right maxillary sinus and anterior ethmoid air cells.  There was varying levels of mucosal thickening involving the paranasal sinuses.    --------------------------------------------------  Procedure: Nasal endoscopy - Diagnostic  Indication: Chronic rhinitis, concern for chronic sinusitis  Informed consent verbally obtained: The risks, benefits, alternatives, and expectations were discussed with the patient, who wished to proceed  Anesthesia: Topical lidocaine/oxymetazoline    Findings: After topical anesthetic was applied the nasal cavities were examined with a flexible endoscope. The septum was relatively midline.  - Right: The inferior turbinate was moderately hypertrophied.  Clear drainage appreciated tracking along the inferior meatus to the nasopharynx.  The middle turbinate appeared healthy.  There is evidence of prior widely patent maxillary antrostomy with questionable scarring along the floor/anterior wall.  The ethmoid sinuses are clear to the skull base with widely patent frontal sinus outflow tract.  The sphenoid sinus is widely patent.  - Left: The inferior turbinate was moderately hypertrophied.  Thicker  mucoid drainage appreciated tracking along the inferior meatus to the nasopharynx.  The middle turbinate appeared healthy.  There is evidence of prior surgery with widely patent maxillary antrostomy, ethmoid sinuses, sphenoid os, and frontal sinus outflow tract.    There is bilateral symmetric true vocal fold motion.  No pharyngeal/pharyngeal lesions nor masses.    There were no complications and the patient tolerated the procedure well.  --------------------------------------------------    ASSESSMENT/PLAN  Diagnoses and all orders for this visit:  Otalgia, unspecified laterality  -     Referral to ENT  Rhinorrhea  Allergic rhinitis due to other allergic trigger, unspecified seasonality  -     azelastine (Astelin) 137 mcg (0.1 %) nasal spray; Administer 1 spray into each nostril 2 times a day. Use in each nostril as directed  Chronic pansinusitis  -     CT sinuss wo IV contrast volumetric surgical planning; Future  -     fluticasone (Flonase) 50 mcg/actuation nasal spray; Administer 1 spray into each nostril 2 times a day. Shake gently. Before first use, prime pump. After use, clean tip and replace cap.    63 y.o. male with longstanding nasal and sinus concerns partially secondary to allergic rhinitis.    1.  Chronic/allergic rhinitis, history of chronic sinusitis, nasal drainage  The patient reports longstanding difficulty with sinus disease.  He has a history of dust mite allergy and is currently getting immunotherapy.  However, over the last half year he has had increasing thick nasal drainage that has not responded well to multiple rounds of antibiotics.    Endoscopy: Evidence of prior surgery fully healed.  Questionable scarring within the right maxillary sinus.  Thicker drainage within the nasal cavity but no evidence of sinus infection.  Imaging: Ordered    Given how well the patient appears today on endoscopy, I recommended that he restart medical therapy with his irrigations and see if there is any  improvement.  I suspect that much of his symptoms are secondary to his allergic rhinitis which I do not believe that we will be able to control surgically.  I am slightly concerned that there may be some scarring within the right maxillary sinus and given his multiple rounds of antibiotics I recommended he obtain a CT sinus to better evaluate this.  I will call him with the results.  In the meantime he was amenable to retrialing medical therapy.  He will continue work with allergy.    This note was created using speech recognition transcription software. Despite proofreading, typographical errors may be present that affect the meaning of the content. Please contact my office with any questions.

## 2023-12-08 ENCOUNTER — CLINICAL SUPPORT (OUTPATIENT)
Dept: ALLERGY | Facility: CLINIC | Age: 63
End: 2023-12-08
Payer: COMMERCIAL

## 2023-12-08 DIAGNOSIS — J30.9 ALLERGIC RHINITIS, UNSPECIFIED SEASONALITY, UNSPECIFIED TRIGGER: ICD-10-CM

## 2023-12-08 PROCEDURE — 95115 IMMUNOTHERAPY ONE INJECTION: CPT | Performed by: ALLERGY & IMMUNOLOGY

## 2023-12-14 PROBLEM — J30.9 ALLERGIC RHINITIS: Status: ACTIVE | Noted: 2023-12-14

## 2023-12-15 ENCOUNTER — OFFICE VISIT (OUTPATIENT)
Dept: ALLERGY | Facility: CLINIC | Age: 63
End: 2023-12-15
Payer: COMMERCIAL

## 2023-12-15 VITALS — BODY MASS INDEX: 27.53 KG/M2 | WEIGHT: 203 LBS | OXYGEN SATURATION: 98 % | HEART RATE: 70 BPM

## 2023-12-15 DIAGNOSIS — J32.2 CHRONIC ETHMOIDAL SINUSITIS: ICD-10-CM

## 2023-12-15 DIAGNOSIS — J30.9 ALLERGIC RHINITIS, UNSPECIFIED SEASONALITY, UNSPECIFIED TRIGGER: Primary | ICD-10-CM

## 2023-12-15 DIAGNOSIS — B99.9 CHRONIC INFECTION: ICD-10-CM

## 2023-12-15 PROCEDURE — 1036F TOBACCO NON-USER: CPT | Performed by: ALLERGY & IMMUNOLOGY

## 2023-12-15 PROCEDURE — 99214 OFFICE O/P EST MOD 30 MIN: CPT | Performed by: ALLERGY & IMMUNOLOGY

## 2023-12-15 NOTE — PROGRESS NOTES
Subjective   Patient ID:   [id number]   Antony Pizarro is a 63 y.o. male who presents for Allergies (Annual follow up ).    Chief Complaint   Patient presents with    Allergies     Annual follow up         Started IT 11/16/2021    Since last visit, 12/2022, patient complains of 3-4 months of green sinus drainage and antibiotics, which typically is effective.    He feels the shots have helped.  Patient saw Dr. De Leon last week and did see drainage.  Patient has been performing nasal rinses with plain saline daily and the green secretions have resolved.  Dr. Moore added an antibiotic to his rinses after he performed surgery on the patient in 2020.     Patient was supposed to have Linx surgery but had the flu and had to cancel.  He has a CT sinuses Tuesday.      Patient does not take pain medications and denies any known allergy other than doxycycline.         Review of Systems   HENT:  Positive for postnasal drip.          Objective     Pulse 70   Wt 92.1 kg (203 lb)   SpO2 98%   BMI 27.53 kg/m²      Physical Exam  Constitutional:       Appearance: Normal appearance.   HENT:      Head: Normocephalic and atraumatic.      Right Ear: External ear normal. There is no impacted cerumen.      Left Ear: External ear normal. There is no impacted cerumen.      Nose: Congestion present. No rhinorrhea.   Eyes:      Extraocular Movements: Extraocular movements intact.      Conjunctiva/sclera: Conjunctivae normal.      Pupils: Pupils are equal, round, and reactive to light.   Cardiovascular:      Rate and Rhythm: Normal rate and regular rhythm.      Heart sounds: No murmur heard.     No friction rub. No gallop.   Pulmonary:      Effort: No respiratory distress.      Breath sounds: No wheezing, rhonchi or rales.   Skin:     General: Skin is warm and dry.   Neurological:      Mental Status: He is alert.   Psychiatric:         Mood and Affect: Mood normal.         Behavior: Behavior normal.            Current Outpatient  Medications   Medication Sig Dispense Refill    atorvastatin (Lipitor) 10 mg tablet Take 1 tablet (10 mg) by mouth once a day on Monday, Wednesday, and Friday. 36 tablet 3    azelastine (Astelin) 137 mcg (0.1 %) nasal spray Administer 1 spray into each nostril 2 times a day. Use in each nostril as directed 30 mL 6    cholecalciferol (Vitamin D-3) 125 MCG (5000 UT) capsule Take by mouth.      fluticasone (Flonase) 50 mcg/actuation nasal spray Administer 1 spray into each nostril 2 times a day. Shake gently. Before first use, prime pump. After use, clean tip and replace cap. 16 g 6    loratadine (Claritin Liqui-Gel) 10 mg capsule Take by mouth.      magnesium oxide (Mag-Ox) 400 mg (241.3 mg magnesium) tablet Take by mouth.      omega-3 fatty acids (FISH OIL CONCENTRATE ORAL) Take by mouth.      omeprazole (PriLOSEC) 40 mg DR capsule Take 1 capsule (40 mg) by mouth 2 times a day.      ubidecarenone/vitamin E (COENZYME Q10-VITAMIN E ORAL) Take by mouth.      zinc sulfate (Zincate) 220 (50 Zn) MG capsule Take by mouth.      ascorbic acid, vitamin C, crystals Take by mouth.       No current facility-administered medications for this visit.         Orders Placed This Encounter   Procedures    Strep Pneumo IgG Ab 23 Serotypes     Standing Status:   Future     Standing Expiration Date:   12/15/2024     Order Specific Question:   Release result to [facility]     Answer:   Immediate    Immunoglobulins (IgG, IgA, IgM)     Standing Status:   Future     Standing Expiration Date:   12/15/2024     Order Specific Question:   [facility] result to [facility]     Answer:   Immediate    Lymphocyte Proliferation to Antigens     Standing Status:   Future     Standing Expiration Date:   12/15/2024     Order Specific Question:   Release result to [facility]     Answer:   Immediate    Lymphocyte Proliferation to Mitogens     Standing Status:   Future     Standing Expiration Date:   12/15/2024     Order Specific Question:   Release result to [facility] [facility]      Answer:   Immediate    Porter Binding Lectin     Standing Status:   Future     Standing Expiration Date:   12/15/2024     Order Specific Question:   Release result to [facility]     Answer:   Immediate [1]        Assessment/Plan         Allergic rhinitis  He feels that his symptoms are better with immunotherapy but he continues to have recurrent sinusitis. His symptoms restarted over the summer and have been persistent. He has not had recurrent infections since starting his IT until this summer.         Chronic ethmoidal sinusitis  He has a CT scan ordered for this month. Due to the unusual reoccurrence of his infections I would like him to undergo immunologic testing. His symptoms may be induced by GERD. He is planning a Linx procedure      By signing my name below, I, Santiago Yoder, attest that this documentation has been prepared under the direction and in the presence of Ramila Dwyer MD.  All medical record entries made by the Scribe were at my direction and personally dictated by me. I have reviewed the chart and agree that the record accurately reflects my personal performance of the history, physical exam, discussion and plan.

## 2023-12-15 NOTE — PATIENT INSTRUCTIONS
Obtain blood work in the morning to evaluate immune system.  We will call you with results, recommendations and followup plan.

## 2023-12-18 NOTE — ASSESSMENT & PLAN NOTE
Ongoing SW/CM Assessment/Plan of Care Note     See SW/CM flowsheets for goals and other objective data.    Patient/Family discharge goal (s):  Goal #1: Home discharge arranged  Goal #2: Transportation arranged or issues addressed  Goal #3: Communication facilitated    PT Recommendation:          OT Recommendation:          SLP Recommendation:       Disposition:  Planned Discharge Destination: Home/apartment with Spouse/SO    Progress note:   0842 met with patient, role explained and discharge plan reviewed. Pt had colonoscopy and EGD done yesterday. Pt is independent with ADLs and IADLs. Plan to go home when medically stable. Pt does not have any home care needs at this time.     Discharge plan collaborated with nursing and provider.      Discharge plan as follows:     Discharge Destination: home with spouse     Services: N/A     Transportation: spouse     DME: cane     Medications:  Patient[facility]      Medical Appointments:   Jul22 Medicare Well Visit with Nicolas Farrell MD  Thursday Jul 22, 2021 1:00 PM (Arrive by 12:45 PM)        Hospital Discharge Appeal Notices: IMM signed 4/15/21.     POA-HC: document provided and discussed by previous CM.     COVID Test: 4/12 negative      Plan  SW/CM - Recommendations for Discharge:    home  PT - Recommendations for Discharge:      OT - Recommendations for Discharge:      SLP - Recommendations for Discharge:     Anticipate patient may need post-hospital services. Necessary services are available.  Anticipate patient can return to the environment from which patient entered the hospital.   Anticipate patient can provide self-care at discharge.     Refer to SW/CM Flowsheet for Goals and objective data.         He has a CT scan ordered for this month. Due to the unusual reoccurrence of his infections I would like him to undergo immunologic testing. His symptoms may be induced by GERD. He is planning a Linx procedure

## 2023-12-18 NOTE — ASSESSMENT & PLAN NOTE
He feels that his symptoms are better with immunotherapy but he continues to have recurrent sinusitis. His symptoms restarted over the summer and have been persistent. He has not had recurrent infections since starting his IT until this summer.

## 2023-12-19 ENCOUNTER — ANCILLARY PROCEDURE (OUTPATIENT)
Dept: RADIOLOGY | Facility: CLINIC | Age: 63
End: 2023-12-19
Payer: COMMERCIAL

## 2023-12-19 DIAGNOSIS — J32.4 CHRONIC PANSINUSITIS: ICD-10-CM

## 2023-12-19 PROCEDURE — 70486 CT MAXILLOFACIAL W/O DYE: CPT | Performed by: RADIOLOGY

## 2023-12-19 PROCEDURE — 70486 CT MAXILLOFACIAL W/O DYE: CPT

## 2024-01-02 ENCOUNTER — CLINICAL SUPPORT (OUTPATIENT)
Dept: ALLERGY | Facility: CLINIC | Age: 64
End: 2024-01-02
Payer: COMMERCIAL

## 2024-01-02 DIAGNOSIS — J30.9 ALLERGIC RHINITIS, UNSPECIFIED SEASONALITY, UNSPECIFIED TRIGGER: ICD-10-CM

## 2024-01-02 PROCEDURE — 95115 IMMUNOTHERAPY ONE INJECTION: CPT | Performed by: ALLERGY & IMMUNOLOGY

## 2024-01-05 ENCOUNTER — APPOINTMENT (OUTPATIENT)
Dept: ALLERGY | Facility: CLINIC | Age: 64
End: 2024-01-05
Payer: COMMERCIAL

## 2024-01-05 ENCOUNTER — APPOINTMENT (OUTPATIENT)
Dept: PRIMARY CARE | Facility: CLINIC | Age: 64
End: 2024-01-05
Payer: COMMERCIAL

## 2024-01-05 ENCOUNTER — APPOINTMENT (OUTPATIENT)
Dept: OPERATING ROOM | Facility: HOSPITAL | Age: 64
End: 2024-01-05
Payer: COMMERCIAL

## 2024-01-09 ENCOUNTER — LAB (OUTPATIENT)
Dept: LAB | Facility: LAB | Age: 64
End: 2024-01-09
Payer: COMMERCIAL

## 2024-01-09 DIAGNOSIS — B99.9 CHRONIC INFECTION: ICD-10-CM

## 2024-01-09 DIAGNOSIS — K21.9 GASTROESOPHAGEAL REFLUX DISEASE, UNSPECIFIED WHETHER ESOPHAGITIS PRESENT: ICD-10-CM

## 2024-01-09 LAB
ALBUMIN SERPL BCP-MCNC: 4.3 G/DL (ref 3.4–5)
ALP SERPL-CCNC: 61 U/L (ref 33–136)
ALT SERPL W P-5'-P-CCNC: 25 U/L (ref 10–52)
ANION GAP SERPL CALC-SCNC: 11 MMOL/L (ref 10–20)
AST SERPL W P-5'-P-CCNC: 25 U/L (ref 9–39)
BASOPHILS # BLD AUTO: 0.03 X10*3/UL (ref 0–0.1)
BASOPHILS NFR BLD AUTO: 0.7 %
BILIRUB SERPL-MCNC: 1.1 MG/DL (ref 0–1.2)
BUN SERPL-MCNC: 17 MG/DL (ref 6–23)
CALCIUM SERPL-MCNC: 9.4 MG/DL (ref 8.6–10.3)
CHLORIDE SERPL-SCNC: 104 MMOL/L (ref 98–107)
CO2 SERPL-SCNC: 31 MMOL/L (ref 21–32)
CREAT SERPL-MCNC: 1.04 MG/DL (ref 0.5–1.3)
EGFRCR SERPLBLD CKD-EPI 2021: 81 ML/MIN/1.73M*2
EOSINOPHIL # BLD AUTO: 0.09 X10*3/UL (ref 0–0.7)
EOSINOPHIL NFR BLD AUTO: 2.1 %
ERYTHROCYTE [DISTWIDTH] IN BLOOD BY AUTOMATED COUNT: 12.4 % (ref 11.5–14.5)
GLUCOSE SERPL-MCNC: 95 MG/DL (ref 74–99)
HCT VFR BLD AUTO: 46.4 % (ref 41–52)
HGB BLD-MCNC: 15.5 G/DL (ref 13.5–17.5)
IGA SERPL-MCNC: 140 MG/DL (ref 70–400)
IGG SERPL-MCNC: 967 MG/DL (ref 700–1600)
IGM SERPL-MCNC: 120 MG/DL (ref 40–230)
IMM GRANULOCYTES # BLD AUTO: 0.01 X10*3/UL (ref 0–0.7)
IMM GRANULOCYTES NFR BLD AUTO: 0.2 % (ref 0–0.9)
INR PPP: 1 (ref 0.9–1.1)
LYMPHOCYTES # BLD AUTO: 1.74 X10*3/UL (ref 1.2–4.8)
LYMPHOCYTES NFR BLD AUTO: 40.7 %
MCH RBC QN AUTO: 31.1 PG (ref 26–34)
MCHC RBC AUTO-ENTMCNC: 33.4 G/DL (ref 32–36)
MCV RBC AUTO: 93 FL (ref 80–100)
MONOCYTES # BLD AUTO: 0.5 X10*3/UL (ref 0.1–1)
MONOCYTES NFR BLD AUTO: 11.7 %
NEUTROPHILS # BLD AUTO: 1.9 X10*3/UL (ref 1.2–7.7)
NEUTROPHILS NFR BLD AUTO: 44.6 %
NRBC BLD-RTO: 0 /100 WBCS (ref 0–0)
PLATELET # BLD AUTO: 182 X10*3/UL (ref 150–450)
POTASSIUM SERPL-SCNC: 3.8 MMOL/L (ref 3.5–5.3)
PROT SERPL-MCNC: 6.7 G/DL (ref 6.4–8.2)
PROTHROMBIN TIME: 11.3 SECONDS (ref 9.8–12.8)
RBC # BLD AUTO: 4.99 X10*6/UL (ref 4.5–5.9)
SODIUM SERPL-SCNC: 142 MMOL/L (ref 136–145)
WBC # BLD AUTO: 4.3 X10*3/UL (ref 4.4–11.3)

## 2024-01-09 PROCEDURE — 36415 COLL VENOUS BLD VENIPUNCTURE: CPT

## 2024-01-09 PROCEDURE — 80053 COMPREHEN METABOLIC PANEL: CPT

## 2024-01-09 PROCEDURE — 86353 LYMPHOCYTE TRANSFORMATION: CPT

## 2024-01-09 PROCEDURE — 86317 IMMUNOASSAY INFECTIOUS AGENT: CPT

## 2024-01-09 PROCEDURE — 85025 COMPLETE CBC W/AUTO DIFF WBC: CPT

## 2024-01-09 PROCEDURE — 83520 IMMUNOASSAY QUANT NOS NONAB: CPT

## 2024-01-09 PROCEDURE — 85610 PROTHROMBIN TIME: CPT

## 2024-01-09 PROCEDURE — 82784 ASSAY IGA/IGD/IGG/IGM EACH: CPT

## 2024-01-12 LAB
S PN DA SERO 19F IGG SER-MCNC: 2.51 UG/ML
S PNEUM DA 1 IGG SER-MCNC: 41.87 UG/ML
S PNEUM DA 10A IGG SER-MCNC: 0.37 UG/ML
S PNEUM DA 11A IGG SER-MCNC: 0.02 UG/ML
S PNEUM DA 12F IGG SER-MCNC: 0.93 UG/ML
S PNEUM DA 14 IGG SER-MCNC: 0.18 UG/ML
S PNEUM DA 15B IGG SER-MCNC: 3.6 UG/ML
S PNEUM DA 17F IGG SER-MCNC: 2.77 UG/ML
S PNEUM DA 18C IGG SER-MCNC: 0.15 UG/ML
S PNEUM DA 19A IGG SER-MCNC: 3.42 UG/ML
S PNEUM DA 2 IGG SER-MCNC: 0.72 UG/ML
S PNEUM DA 20A IGG SER-MCNC: 0.63 UG/ML
S PNEUM DA 22F IGG SER-MCNC: 4.02 UG/ML
S PNEUM DA 23F IGG SER-MCNC: 0.15 UG/ML
S PNEUM DA 3 IGG SER-MCNC: 0.81 UG/ML
S PNEUM DA 33F IGG SER-MCNC: 1.21 UG/ML
S PNEUM DA 4 IGG SER-MCNC: 0.31 UG/ML
S PNEUM DA 5 IGG SER-MCNC: 1.81 UG/ML
S PNEUM DA 6B IGG SER-MCNC: 0.27 UG/ML
S PNEUM DA 7F IGG SER-MCNC: 2.44 UG/ML
S PNEUM DA 8 IGG SER-MCNC: 11.17 UG/ML
S PNEUM DA 9N IGG SER-MCNC: 0.79 UG/ML
S PNEUM DA 9V IGG SER-MCNC: 0.31 UG/ML
S PNEUM SEROTYPE IGG SER-IMP: NORMAL

## 2024-01-15 ENCOUNTER — PREP FOR PROCEDURE (OUTPATIENT)
Dept: BARIATRICS | Facility: HOSPITAL | Age: 64
End: 2024-01-15
Payer: COMMERCIAL

## 2024-01-15 LAB
ANNOTATION COMMENT IMP: NORMAL
FLOW CYTOMETRY SPECIALIST REVIEW: NORMAL
LPT OKT3 BLD-NRATE: 90 %
LPT PHA MAX/CD45 NFR BLD FC: 81.7 %
LPT PW MAX/CD19 NFR BLD FC: 19.5 %
LPT PW/CD3 NFR BLD FC: 23.2 %
LPT PW/CD45 NFR BLD FC: 16.9 %
MANNOSE-BP SER-MCNC: 1342 NG/ML
VIABLE CELLS NFR BLD: 84.3 %

## 2024-01-16 ENCOUNTER — OFFICE VISIT (OUTPATIENT)
Dept: ALLERGY | Facility: CLINIC | Age: 64
End: 2024-01-16
Payer: COMMERCIAL

## 2024-01-16 ENCOUNTER — ANESTHESIA EVENT (OUTPATIENT)
Dept: OPERATING ROOM | Facility: HOSPITAL | Age: 64
DRG: 328 | End: 2024-01-16
Payer: COMMERCIAL

## 2024-01-16 ENCOUNTER — TELEPHONE (OUTPATIENT)
Dept: CARDIOLOGY | Facility: CLINIC | Age: 64
End: 2024-01-16

## 2024-01-16 VITALS — BODY MASS INDEX: 27.53 KG/M2 | WEIGHT: 203 LBS

## 2024-01-16 DIAGNOSIS — J32.2 CHRONIC ETHMOIDAL SINUSITIS: ICD-10-CM

## 2024-01-16 DIAGNOSIS — D69.3 CHRONIC ITP (IDIOPATHIC THROMBOCYTOPENIA) (MULTI): Primary | ICD-10-CM

## 2024-01-16 PROCEDURE — 87070 CULTURE OTHR SPECIMN AEROBIC: CPT

## 2024-01-16 PROCEDURE — 87184 SC STD DISK METHOD PER PLATE: CPT

## 2024-01-16 PROCEDURE — 99214 OFFICE O/P EST MOD 30 MIN: CPT | Performed by: ALLERGY & IMMUNOLOGY

## 2024-01-16 PROCEDURE — 1036F TOBACCO NON-USER: CPT | Performed by: ALLERGY & IMMUNOLOGY

## 2024-01-16 PROCEDURE — 87205 SMEAR GRAM STAIN: CPT

## 2024-01-16 PROCEDURE — 87181 SC STD AGAR DILUTION PER AGT: CPT

## 2024-01-16 PROCEDURE — 87075 CULTR BACTERIA EXCEPT BLOOD: CPT

## 2024-01-16 ASSESSMENT — ENCOUNTER SYMPTOMS: RHINORRHEA: 1

## 2024-01-16 NOTE — TELEPHONE ENCOUNTER
Patient is scheduled for surgery 01/18/24 and anesthesia just notified them that he needs cardiac clearance.  Will the last office visit be sufficient to clear or will he need to cancel an reschedule his surgery?    Please call Deanne at Dr Morataya office.

## 2024-01-16 NOTE — PATIENT INSTRUCTIONS
Provide a sputum sample in the containers provided.  Do NOT refrigerate and take to the lab at your earliest convenience.    Pneumovax was administered today.      Obtain blood work to evaluate response in 4-6 weeks.  We will call you with results, recommendations and followup plan.

## 2024-01-16 NOTE — ASSESSMENT & PLAN NOTE
Provide a sputum sample in the containers provided.  Do NOT refrigerate and take to the lab at your earliest convenience.

## 2024-01-16 NOTE — PROGRESS NOTES
Subjective   Patient ID:   [id number]   Antony Pizarro is a 63 y.o. male who presents for Follow-up ( ).    Chief Complaint   Patient presents with    Follow-up     BW         Started IT 11-.    Since last visit, 12-, patient presents for discussion on lab results.      Patient states from July through November he had yellow phlegm, which resolved with antibiotics.  He had recurrence of yellow phlegm recently.  He is having the Linx procedure for his reflux this coming Thursday.  He uses a Neti pot, which helps but is blowing yellow phlegm from his nose and phlegm from his reflux during the day.      Patient is hoping the surgery helps because his body is under constant stress due to his health issues.     had his Pneumovax in 2019.    Pre vaccination titers showed 22/23 were negative.  Post vaccination titers showed 18/23 that were positive.  On rechecking 01-, he had 10/23, placing him under 50%.           Review of Systems   HENT:  Positive for rhinorrhea (green phlegm).      Objective     Wt 92.1 kg (203 lb)   BMI 27.53 kg/m²      Physical Exam  Constitutional:       Appearance: Normal appearance.   HENT:      Head: Normocephalic and atraumatic.   Eyes:      Extraocular Movements: Extraocular movements intact.      Conjunctiva/sclera: Conjunctivae normal.      Pupils: Pupils are equal, round, and reactive to light.   Pulmonary:      Effort: Pulmonary effort is normal.   Musculoskeletal:      Cervical back: Normal range of motion.   Skin:     Findings: No rash.   Neurological:      Mental Status: He is alert and oriented to person, place, and time. Mental status is at baseline.   Psychiatric:         Mood and Affect: Mood normal.         Behavior: Behavior normal.         Thought Content: Thought content normal.         Judgment: Judgment normal.         Assessment/Plan       Chronic ethmoidal sinusitis  Provide a sputum sample in the containers provided.  Do NOT refrigerate and take to  the lab at your earliest convenience.      Allergic rhinitis  He is doing very well on IT. He will continue monthly dosing.    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 Ultimately we decided to give another pneumovax and check response. Also, we will see if his infections are lessened post operatively for linx procedure. If infections persist we will revisit immunoglobulin infusions.       By signing my name below, I, Nishant Yoderibaquiles, attest that this documentation has been prepared under the direction and in the presence of Ramila Dwyer MD.   All medical record entries made by the Scribe were at my direction and personally dictated by me. I have reviewed the chart and agree that the record accurately reflects my personal performance of the history, physical exam, discussion and plan.

## 2024-01-16 NOTE — TELEPHONE ENCOUNTER
Pt scheduled for Repair diaphragmatic hernia laparoscopy, LES, augmented-LINX on 1/18/24.    Please advise of clearance.

## 2024-01-17 ENCOUNTER — LAB (OUTPATIENT)
Dept: LAB | Facility: LAB | Age: 64
End: 2024-01-17
Payer: COMMERCIAL

## 2024-01-17 PROBLEM — R12 HEARTBURN: Status: ACTIVE | Noted: 2024-01-17

## 2024-01-17 LAB
ANNOTATION COMMENT IMP: ABNORMAL
FLOW CYTOMETRY SPECIALIST REVIEW: ABNORMAL
LPT CA MAX/CD3 BLD FC-NFR: 30 %
LPT CA MAX/CD45 BLD FC-NFR: 22.2 %
LPT TT MAX/CD3 BLD FC-NFR: 6.1 %
LPT TT MAX/CD45 BLD FC-NFR: 4.1 %
VIABLE CELLS NFR BLD: 84.3 %

## 2024-01-17 RX ORDER — ONDANSETRON HYDROCHLORIDE 2 MG/ML
4 INJECTION, SOLUTION INTRAVENOUS ONCE AS NEEDED
Status: CANCELLED | OUTPATIENT
Start: 2024-01-17

## 2024-01-17 RX ORDER — OXYCODONE HYDROCHLORIDE 5 MG/1
5 TABLET ORAL EVERY 4 HOURS PRN
Status: CANCELLED | OUTPATIENT
Start: 2024-01-17

## 2024-01-17 RX ORDER — SODIUM CHLORIDE, SODIUM LACTATE, POTASSIUM CHLORIDE, CALCIUM CHLORIDE 600; 310; 30; 20 MG/100ML; MG/100ML; MG/100ML; MG/100ML
100 INJECTION, SOLUTION INTRAVENOUS CONTINUOUS
Status: CANCELLED | OUTPATIENT
Start: 2024-01-17

## 2024-01-17 RX ORDER — DROPERIDOL 2.5 MG/ML
0.62 INJECTION, SOLUTION INTRAMUSCULAR; INTRAVENOUS ONCE AS NEEDED
Status: CANCELLED | OUTPATIENT
Start: 2024-01-17

## 2024-01-18 ENCOUNTER — HOSPITAL ENCOUNTER (INPATIENT)
Facility: HOSPITAL | Age: 64
LOS: 1 days | Discharge: HOME | DRG: 328 | End: 2024-01-19
Attending: SURGERY | Admitting: SURGERY
Payer: COMMERCIAL

## 2024-01-18 ENCOUNTER — ANESTHESIA (OUTPATIENT)
Dept: OPERATING ROOM | Facility: HOSPITAL | Age: 64
DRG: 328 | End: 2024-01-18
Payer: COMMERCIAL

## 2024-01-18 ENCOUNTER — PATIENT MESSAGE (OUTPATIENT)
Dept: ALLERGY | Facility: CLINIC | Age: 64
End: 2024-01-18
Payer: COMMERCIAL

## 2024-01-18 DIAGNOSIS — R12 HEARTBURN: Primary | ICD-10-CM

## 2024-01-18 DIAGNOSIS — K21.9 GASTROESOPHAGEAL REFLUX DISEASE, UNSPECIFIED WHETHER ESOPHAGITIS PRESENT: ICD-10-CM

## 2024-01-18 DIAGNOSIS — J32.2 CHRONIC ETHMOIDAL SINUSITIS: Primary | ICD-10-CM

## 2024-01-18 LAB
ABO GROUP (TYPE) IN BLOOD: NORMAL
ANTIBODY SCREEN: NORMAL
RH FACTOR (ANTIGEN D): NORMAL

## 2024-01-18 PROCEDURE — 0BUT4JZ SUPPLEMENT DIAPHRAGM WITH SYNTHETIC SUBSTITUTE, PERCUTANEOUS ENDOSCOPIC APPROACH: ICD-10-PCS | Performed by: SURGERY

## 2024-01-18 PROCEDURE — 43235 EGD DIAGNOSTIC BRUSH WASH: CPT | Performed by: SURGERY

## 2024-01-18 PROCEDURE — 43282 LAP PARAESOPH HER RPR W/MESH: CPT | Performed by: SURGERY

## 2024-01-18 PROCEDURE — C1889 IMPLANT/INSERT DEVICE, NOC: HCPCS | Performed by: SURGERY

## 2024-01-18 PROCEDURE — 3700000002 HC GENERAL ANESTHESIA TIME - EACH INCREMENTAL 1 MINUTE: Performed by: SURGERY

## 2024-01-18 PROCEDURE — 2500000004 HC RX 250 GENERAL PHARMACY W/ HCPCS (ALT 636 FOR OP/ED): Performed by: NURSE ANESTHETIST, CERTIFIED REGISTERED

## 2024-01-18 PROCEDURE — 3700000001 HC GENERAL ANESTHESIA TIME - INITIAL BASE CHARGE: Performed by: SURGERY

## 2024-01-18 PROCEDURE — 2500000004 HC RX 250 GENERAL PHARMACY W/ HCPCS (ALT 636 FOR OP/ED): Performed by: STUDENT IN AN ORGANIZED HEALTH CARE EDUCATION/TRAINING PROGRAM

## 2024-01-18 PROCEDURE — A4217 STERILE WATER/SALINE, 500 ML: HCPCS | Performed by: SURGERY

## 2024-01-18 PROCEDURE — 2500000004 HC RX 250 GENERAL PHARMACY W/ HCPCS (ALT 636 FOR OP/ED): Performed by: SURGERY

## 2024-01-18 PROCEDURE — 2780000003 HC OR 278 NO HCPCS: Performed by: SURGERY

## 2024-01-18 PROCEDURE — 2500000005 HC RX 250 GENERAL PHARMACY W/O HCPCS: Performed by: NURSE ANESTHETIST, CERTIFIED REGISTERED

## 2024-01-18 PROCEDURE — 0DV44CZ RESTRICTION OF ESOPHAGOGASTRIC JUNCTION WITH EXTRALUMINAL DEVICE, PERCUTANEOUS ENDOSCOPIC APPROACH: ICD-10-PCS | Performed by: SURGERY

## 2024-01-18 PROCEDURE — 2720000007 HC OR 272 NO HCPCS: Performed by: SURGERY

## 2024-01-18 PROCEDURE — 3600000009 HC OR TIME - EACH INCREMENTAL 1 MINUTE - PROCEDURE LEVEL FOUR: Performed by: SURGERY

## 2024-01-18 PROCEDURE — 7100000001 HC RECOVERY ROOM TIME - INITIAL BASE CHARGE: Performed by: SURGERY

## 2024-01-18 PROCEDURE — 86900 BLOOD TYPING SEROLOGIC ABO: CPT | Performed by: STUDENT IN AN ORGANIZED HEALTH CARE EDUCATION/TRAINING PROGRAM

## 2024-01-18 PROCEDURE — 3600000004 HC OR TIME - INITIAL BASE CHARGE - PROCEDURE LEVEL FOUR: Performed by: SURGERY

## 2024-01-18 PROCEDURE — A4216 STERILE WATER/SALINE, 10 ML: HCPCS | Performed by: SURGERY

## 2024-01-18 PROCEDURE — 43282 LAP PARAESOPH HER RPR W/MESH: CPT | Performed by: STUDENT IN AN ORGANIZED HEALTH CARE EDUCATION/TRAINING PROGRAM

## 2024-01-18 PROCEDURE — 36415 COLL VENOUS BLD VENIPUNCTURE: CPT | Performed by: STUDENT IN AN ORGANIZED HEALTH CARE EDUCATION/TRAINING PROGRAM

## 2024-01-18 PROCEDURE — 7100000002 HC RECOVERY ROOM TIME - EACH INCREMENTAL 1 MINUTE: Performed by: SURGERY

## 2024-01-18 PROCEDURE — 1100000001 HC PRIVATE ROOM DAILY

## 2024-01-18 PROCEDURE — 2500000005 HC RX 250 GENERAL PHARMACY W/O HCPCS: Performed by: SURGERY

## 2024-01-18 PROCEDURE — A43282 PR LAP, REPAIR PARAESOPHAGEAL HERNIA, INCL FUNDOPLASTY W/ MESH: Performed by: NURSE ANESTHETIST, CERTIFIED REGISTERED

## 2024-01-18 DEVICE — LINX REFLUX MANAGEMENT SYSTEM
Type: IMPLANTABLE DEVICE | Site: ESOPHAGUS | Status: FUNCTIONAL
Brand: LINX

## 2024-01-18 RX ORDER — HYDROMORPHONE HYDROCHLORIDE 2 MG/ML
INJECTION, SOLUTION INTRAMUSCULAR; INTRAVENOUS; SUBCUTANEOUS AS NEEDED
Status: DISCONTINUED | OUTPATIENT
Start: 2024-01-18 | End: 2024-01-18

## 2024-01-18 RX ORDER — PROCHLORPERAZINE EDISYLATE 5 MG/ML
10 INJECTION INTRAMUSCULAR; INTRAVENOUS EVERY 6 HOURS PRN
Status: DISCONTINUED | OUTPATIENT
Start: 2024-01-18 | End: 2024-01-19 | Stop reason: HOSPADM

## 2024-01-18 RX ORDER — OXYCODONE HCL 5 MG/5 ML
5 SOLUTION, ORAL ORAL EVERY 6 HOURS PRN
Status: DISCONTINUED | OUTPATIENT
Start: 2024-01-18 | End: 2024-01-19 | Stop reason: HOSPADM

## 2024-01-18 RX ORDER — GABAPENTIN 300 MG/1
300 CAPSULE ORAL 3 TIMES DAILY
Status: DISCONTINUED | OUTPATIENT
Start: 2024-01-18 | End: 2024-01-19 | Stop reason: HOSPADM

## 2024-01-18 RX ORDER — ONDANSETRON HYDROCHLORIDE 2 MG/ML
INJECTION, SOLUTION INTRAVENOUS AS NEEDED
Status: DISCONTINUED | OUTPATIENT
Start: 2024-01-18 | End: 2024-01-18

## 2024-01-18 RX ORDER — MIDAZOLAM HYDROCHLORIDE 1 MG/ML
INJECTION INTRAMUSCULAR; INTRAVENOUS AS NEEDED
Status: DISCONTINUED | OUTPATIENT
Start: 2024-01-18 | End: 2024-01-18

## 2024-01-18 RX ORDER — PROCHLORPERAZINE MALEATE 5 MG
10 TABLET ORAL EVERY 6 HOURS PRN
Status: DISCONTINUED | OUTPATIENT
Start: 2024-01-18 | End: 2024-01-19 | Stop reason: HOSPADM

## 2024-01-18 RX ORDER — PROCHLORPERAZINE 25 MG/1
25 SUPPOSITORY RECTAL EVERY 12 HOURS PRN
Status: DISCONTINUED | OUTPATIENT
Start: 2024-01-18 | End: 2024-01-19 | Stop reason: HOSPADM

## 2024-01-18 RX ORDER — SUCCINYLCHOLINE CHLORIDE 20 MG/ML
INJECTION INTRAMUSCULAR; INTRAVENOUS AS NEEDED
Status: DISCONTINUED | OUTPATIENT
Start: 2024-01-18 | End: 2024-01-18

## 2024-01-18 RX ORDER — SODIUM CHLORIDE, SODIUM LACTATE, POTASSIUM CHLORIDE, CALCIUM CHLORIDE 600; 310; 30; 20 MG/100ML; MG/100ML; MG/100ML; MG/100ML
100 INJECTION, SOLUTION INTRAVENOUS CONTINUOUS
Status: DISCONTINUED | OUTPATIENT
Start: 2024-01-18 | End: 2024-01-18

## 2024-01-18 RX ORDER — AMOXICILLIN AND CLAVULANATE POTASSIUM 875; 125 MG/1; MG/1
1 TABLET, FILM COATED ORAL 2 TIMES DAILY
Qty: 20 TABLET | Refills: 0 | Status: SHIPPED | OUTPATIENT
Start: 2024-01-18 | End: 2024-01-23 | Stop reason: ALTCHOICE

## 2024-01-18 RX ORDER — BUPIVACAINE HCL/EPINEPHRINE 0.5-1:200K
VIAL (ML) INJECTION AS NEEDED
Status: DISCONTINUED | OUTPATIENT
Start: 2024-01-18 | End: 2024-01-18 | Stop reason: HOSPADM

## 2024-01-18 RX ORDER — SODIUM CHLORIDE 0.9 G/100ML
IRRIGANT IRRIGATION AS NEEDED
Status: DISCONTINUED | OUTPATIENT
Start: 2024-01-18 | End: 2024-01-18 | Stop reason: HOSPADM

## 2024-01-18 RX ORDER — FENTANYL CITRATE 50 UG/ML
INJECTION, SOLUTION INTRAMUSCULAR; INTRAVENOUS AS NEEDED
Status: DISCONTINUED | OUTPATIENT
Start: 2024-01-18 | End: 2024-01-18

## 2024-01-18 RX ORDER — PANTOPRAZOLE SODIUM 40 MG/1
40 TABLET, DELAYED RELEASE ORAL
Status: DISCONTINUED | OUTPATIENT
Start: 2024-01-19 | End: 2024-01-19 | Stop reason: HOSPADM

## 2024-01-18 RX ORDER — LIDOCAINE HYDROCHLORIDE 10 MG/ML
INJECTION, SOLUTION EPIDURAL; INFILTRATION; INTRACAUDAL; PERINEURAL AS NEEDED
Status: DISCONTINUED | OUTPATIENT
Start: 2024-01-18 | End: 2024-01-18

## 2024-01-18 RX ORDER — CEFAZOLIN 1 G/1
INJECTION, POWDER, FOR SOLUTION INTRAVENOUS AS NEEDED
Status: DISCONTINUED | OUTPATIENT
Start: 2024-01-18 | End: 2024-01-18

## 2024-01-18 RX ORDER — OXYCODONE HCL 5 MG/5 ML
10 SOLUTION, ORAL ORAL EVERY 6 HOURS PRN
Status: DISCONTINUED | OUTPATIENT
Start: 2024-01-18 | End: 2024-01-19 | Stop reason: HOSPADM

## 2024-01-18 RX ORDER — ONDANSETRON 4 MG/1
4 TABLET, FILM COATED ORAL EVERY 8 HOURS PRN
Status: DISCONTINUED | OUTPATIENT
Start: 2024-01-18 | End: 2024-01-19 | Stop reason: HOSPADM

## 2024-01-18 RX ORDER — DEXAMETHASONE SODIUM PHOSPHATE 4 MG/ML
INJECTION, SOLUTION INTRA-ARTICULAR; INTRALESIONAL; INTRAMUSCULAR; INTRAVENOUS; SOFT TISSUE AS NEEDED
Status: DISCONTINUED | OUTPATIENT
Start: 2024-01-18 | End: 2024-01-18

## 2024-01-18 RX ORDER — ACETAMINOPHEN 160 MG/5ML
650 SOLUTION ORAL EVERY 6 HOURS
Status: DISCONTINUED | OUTPATIENT
Start: 2024-01-18 | End: 2024-01-19 | Stop reason: HOSPADM

## 2024-01-18 RX ORDER — PANTOPRAZOLE SODIUM 40 MG/10ML
40 INJECTION, POWDER, LYOPHILIZED, FOR SOLUTION INTRAVENOUS
Status: DISCONTINUED | OUTPATIENT
Start: 2024-01-19 | End: 2024-01-19 | Stop reason: HOSPADM

## 2024-01-18 RX ORDER — PROPOFOL 10 MG/ML
INJECTION, EMULSION INTRAVENOUS CONTINUOUS PRN
Status: DISCONTINUED | OUTPATIENT
Start: 2024-01-18 | End: 2024-01-18

## 2024-01-18 RX ORDER — NORETHINDRONE AND ETHINYL ESTRADIOL 0.5-0.035
KIT ORAL AS NEEDED
Status: DISCONTINUED | OUTPATIENT
Start: 2024-01-18 | End: 2024-01-18

## 2024-01-18 RX ORDER — ONDANSETRON HYDROCHLORIDE 2 MG/ML
4 INJECTION, SOLUTION INTRAVENOUS EVERY 8 HOURS PRN
Status: DISCONTINUED | OUTPATIENT
Start: 2024-01-18 | End: 2024-01-19 | Stop reason: HOSPADM

## 2024-01-18 RX ORDER — SODIUM CHLORIDE, SODIUM LACTATE, POTASSIUM CHLORIDE, CALCIUM CHLORIDE 600; 310; 30; 20 MG/100ML; MG/100ML; MG/100ML; MG/100ML
75 INJECTION, SOLUTION INTRAVENOUS CONTINUOUS
Status: DISCONTINUED | OUTPATIENT
Start: 2024-01-18 | End: 2024-01-19 | Stop reason: HOSPADM

## 2024-01-18 RX ORDER — DEXTROMETHORPHAN/PSEUDOEPHED 2.5-7.5/.8
40 DROPS ORAL 4 TIMES DAILY PRN
Status: DISCONTINUED | OUTPATIENT
Start: 2024-01-18 | End: 2024-01-19 | Stop reason: HOSPADM

## 2024-01-18 RX ORDER — KETOROLAC TROMETHAMINE 30 MG/ML
INJECTION, SOLUTION INTRAMUSCULAR; INTRAVENOUS AS NEEDED
Status: DISCONTINUED | OUTPATIENT
Start: 2024-01-18 | End: 2024-01-18

## 2024-01-18 RX ORDER — CEFAZOLIN SODIUM 2 G/100ML
2 INJECTION, SOLUTION INTRAVENOUS ONCE
Status: DISCONTINUED | OUTPATIENT
Start: 2024-01-18 | End: 2024-01-18 | Stop reason: HOSPADM

## 2024-01-18 RX ORDER — PROPOFOL 10 MG/ML
INJECTION, EMULSION INTRAVENOUS AS NEEDED
Status: DISCONTINUED | OUTPATIENT
Start: 2024-01-18 | End: 2024-01-18

## 2024-01-18 RX ORDER — SODIUM CHLORIDE 9 MG/ML
INJECTION, SOLUTION INTRAMUSCULAR; INTRAVENOUS; SUBCUTANEOUS AS NEEDED
Status: DISCONTINUED | OUTPATIENT
Start: 2024-01-18 | End: 2024-01-18 | Stop reason: HOSPADM

## 2024-01-18 RX ORDER — ROCURONIUM BROMIDE 10 MG/ML
INJECTION, SOLUTION INTRAVENOUS AS NEEDED
Status: DISCONTINUED | OUTPATIENT
Start: 2024-01-18 | End: 2024-01-18

## 2024-01-18 RX ORDER — SCOLOPAMINE TRANSDERMAL SYSTEM 1 MG/1
1 PATCH, EXTENDED RELEASE TRANSDERMAL ONCE
Status: DISCONTINUED | OUTPATIENT
Start: 2024-01-18 | End: 2024-01-19 | Stop reason: HOSPADM

## 2024-01-18 RX ORDER — LIDOCAINE HYDROCHLORIDE 40 MG/ML
INJECTION, SOLUTION RETROBULBAR AS NEEDED
Status: DISCONTINUED | OUTPATIENT
Start: 2024-01-18 | End: 2024-01-18

## 2024-01-18 RX ORDER — KETOROLAC TROMETHAMINE 15 MG/ML
15 INJECTION, SOLUTION INTRAMUSCULAR; INTRAVENOUS EVERY 6 HOURS
Status: DISCONTINUED | OUTPATIENT
Start: 2024-01-18 | End: 2024-01-19 | Stop reason: HOSPADM

## 2024-01-18 RX ADMIN — FENTANYL CITRATE 100 MCG: 50 INJECTION, SOLUTION INTRAMUSCULAR; INTRAVENOUS at 13:22

## 2024-01-18 RX ADMIN — EPHEDRINE SULFATE 10 MG: 50 INJECTION, SOLUTION INTRAVENOUS at 13:46

## 2024-01-18 RX ADMIN — HYDROMORPHONE HYDROCHLORIDE 0.4 MG: 2 INJECTION, SOLUTION INTRAMUSCULAR; INTRAVENOUS; SUBCUTANEOUS at 13:35

## 2024-01-18 RX ADMIN — PROPOFOL 50 MG: 10 INJECTION, EMULSION INTRAVENOUS at 15:00

## 2024-01-18 RX ADMIN — Medication 2 UNITS: at 14:46

## 2024-01-18 RX ADMIN — ONDANSETRON 4 MG: 2 INJECTION, SOLUTION INTRAMUSCULAR; INTRAVENOUS at 13:11

## 2024-01-18 RX ADMIN — SUGAMMADEX 100 MG: 100 INJECTION, SOLUTION INTRAVENOUS at 15:00

## 2024-01-18 RX ADMIN — KETOROLAC TROMETHAMINE 15 MG: 30 INJECTION, SOLUTION INTRAMUSCULAR; INTRAVENOUS at 14:56

## 2024-01-18 RX ADMIN — SCOPOLAMINE 1 PATCH: 1.5 PATCH, EXTENDED RELEASE TRANSDERMAL at 11:51

## 2024-01-18 RX ADMIN — SODIUM CHLORIDE, POTASSIUM CHLORIDE, SODIUM LACTATE AND CALCIUM CHLORIDE 100 ML/HR: 600; 310; 30; 20 INJECTION, SOLUTION INTRAVENOUS at 11:50

## 2024-01-18 RX ADMIN — PROPOFOL 30 MG: 10 INJECTION, EMULSION INTRAVENOUS at 15:03

## 2024-01-18 RX ADMIN — PROPOFOL 4 MG: 10 INJECTION, EMULSION INTRAVENOUS at 15:08

## 2024-01-18 RX ADMIN — ONDANSETRON 4 MG: 2 INJECTION, SOLUTION INTRAMUSCULAR; INTRAVENOUS at 14:56

## 2024-01-18 RX ADMIN — EPHEDRINE SULFATE 10 MG: 50 INJECTION, SOLUTION INTRAVENOUS at 13:48

## 2024-01-18 RX ADMIN — ROCURONIUM BROMIDE 20 MG: 10 INJECTION, SOLUTION INTRAVENOUS at 14:06

## 2024-01-18 RX ADMIN — PROPOFOL 25 MCG/KG/MIN: 10 INJECTION, EMULSION INTRAVENOUS at 13:26

## 2024-01-18 RX ADMIN — EPHEDRINE SULFATE 10 MG: 50 INJECTION, SOLUTION INTRAVENOUS at 13:47

## 2024-01-18 RX ADMIN — PROPOFOL 180 MG: 10 INJECTION, EMULSION INTRAVENOUS at 13:22

## 2024-01-18 RX ADMIN — MIDAZOLAM HYDROCHLORIDE 0.5 MG: 1 INJECTION, SOLUTION INTRAMUSCULAR; INTRAVENOUS at 13:11

## 2024-01-18 RX ADMIN — ROCURONIUM BROMIDE 10 MG: 10 INJECTION, SOLUTION INTRAVENOUS at 13:22

## 2024-01-18 RX ADMIN — Medication 2 UNITS: at 13:50

## 2024-01-18 RX ADMIN — SODIUM CHLORIDE, POTASSIUM CHLORIDE, SODIUM LACTATE AND CALCIUM CHLORIDE 100 ML/HR: 600; 310; 30; 20 INJECTION, SOLUTION INTRAVENOUS at 17:33

## 2024-01-18 RX ADMIN — DEXAMETHASONE SODIUM PHOSPHATE 4 MG: 4 INJECTION INTRA-ARTICULAR; INTRALESIONAL; INTRAMUSCULAR; INTRAVENOUS; SOFT TISSUE at 13:35

## 2024-01-18 RX ADMIN — SUCCINYLCHOLINE CHLORIDE 160 MG: 20 INJECTION, SOLUTION INTRAMUSCULAR; INTRAVENOUS at 13:22

## 2024-01-18 RX ADMIN — SUGAMMADEX 100 MG: 100 INJECTION, SOLUTION INTRAVENOUS at 15:03

## 2024-01-18 RX ADMIN — LIDOCAINE HYDROCHLORIDE 50 MG: 10 INJECTION, SOLUTION EPIDURAL; INFILTRATION; INTRACAUDAL; PERINEURAL at 13:22

## 2024-01-18 RX ADMIN — CEFAZOLIN 2 G: 330 INJECTION, POWDER, FOR SOLUTION INTRAMUSCULAR; INTRAVENOUS at 13:26

## 2024-01-18 RX ADMIN — SODIUM CHLORIDE, POTASSIUM CHLORIDE, SODIUM LACTATE AND CALCIUM CHLORIDE: 600; 310; 30; 20 INJECTION, SOLUTION INTRAVENOUS at 13:50

## 2024-01-18 RX ADMIN — Medication 2 UNITS: at 14:13

## 2024-01-18 RX ADMIN — SODIUM CHLORIDE, POTASSIUM CHLORIDE, SODIUM LACTATE AND CALCIUM CHLORIDE: 600; 310; 30; 20 INJECTION, SOLUTION INTRAVENOUS at 14:58

## 2024-01-18 RX ADMIN — ROCURONIUM BROMIDE 50 MG: 10 INJECTION, SOLUTION INTRAVENOUS at 13:33

## 2024-01-18 RX ADMIN — ROCURONIUM BROMIDE 20 MG: 10 INJECTION, SOLUTION INTRAVENOUS at 13:41

## 2024-01-18 RX ADMIN — SODIUM CHLORIDE, POTASSIUM CHLORIDE, SODIUM LACTATE AND CALCIUM CHLORIDE: 600; 310; 30; 20 INJECTION, SOLUTION INTRAVENOUS at 15:10

## 2024-01-18 RX ADMIN — LIDOCAINE HYDROCHLORIDE 3 ML: 40 INJECTION, SOLUTION RETROBULBAR; TOPICAL at 13:23

## 2024-01-18 RX ADMIN — HYDROMORPHONE HYDROCHLORIDE 0.4 MG: 2 INJECTION, SOLUTION INTRAMUSCULAR; INTRAVENOUS; SUBCUTANEOUS at 14:04

## 2024-01-18 RX ADMIN — PROPOFOL 20 MG: 10 INJECTION, EMULSION INTRAVENOUS at 15:05

## 2024-01-18 RX ADMIN — HYDROMORPHONE HYDROCHLORIDE 0.4 MG: 2 INJECTION, SOLUTION INTRAMUSCULAR; INTRAVENOUS; SUBCUTANEOUS at 15:02

## 2024-01-18 SDOH — SOCIAL STABILITY: SOCIAL INSECURITY: WERE YOU ABLE TO COMPLETE ALL THE BEHAVIORAL HEALTH SCREENINGS?: YES

## 2024-01-18 SDOH — SOCIAL STABILITY: SOCIAL INSECURITY: ARE THERE ANY APPARENT SIGNS OF INJURIES/BEHAVIORS THAT COULD BE RELATED TO ABUSE/NEGLECT?: NO

## 2024-01-18 SDOH — SOCIAL STABILITY: SOCIAL INSECURITY: HAS ANYONE EVER THREATENED TO HURT YOUR FAMILY OR YOUR PETS?: NO

## 2024-01-18 SDOH — SOCIAL STABILITY: SOCIAL INSECURITY: DO YOU FEEL ANYONE HAS EXPLOITED OR TAKEN ADVANTAGE OF YOU FINANCIALLY OR OF YOUR PERSONAL PROPERTY?: NO

## 2024-01-18 SDOH — HEALTH STABILITY: MENTAL HEALTH: CURRENT SMOKER: 0

## 2024-01-18 SDOH — SOCIAL STABILITY: SOCIAL INSECURITY: DOES ANYONE TRY TO KEEP YOU FROM HAVING/CONTACTING OTHER FRIENDS OR DOING THINGS OUTSIDE YOUR HOME?: NO

## 2024-01-18 SDOH — SOCIAL STABILITY: SOCIAL INSECURITY: ARE YOU OR HAVE YOU BEEN THREATENED OR ABUSED PHYSICALLY, EMOTIONALLY, OR SEXUALLY BY ANYONE?: NO

## 2024-01-18 SDOH — SOCIAL STABILITY: SOCIAL INSECURITY: HAVE YOU HAD THOUGHTS OF HARMING ANYONE ELSE?: NO

## 2024-01-18 SDOH — SOCIAL STABILITY: SOCIAL INSECURITY: ABUSE: ADULT

## 2024-01-18 SDOH — SOCIAL STABILITY: SOCIAL INSECURITY: DO YOU FEEL UNSAFE GOING BACK TO THE PLACE WHERE YOU ARE LIVING?: NO

## 2024-01-18 ASSESSMENT — ACTIVITIES OF DAILY LIVING (ADL)
HEARING - LEFT EAR: FUNCTIONAL
ADEQUATE_TO_COMPLETE_ADL: YES
WALKS IN HOME: INDEPENDENT
HEARING - RIGHT EAR: FUNCTIONAL
TOILETING: INDEPENDENT
DRESSING YOURSELF: INDEPENDENT
FEEDING YOURSELF: INDEPENDENT
JUDGMENT_ADEQUATE_SAFELY_COMPLETE_DAILY_ACTIVITIES: YES
GROOMING: INDEPENDENT
LACK_OF_TRANSPORTATION: PATIENT DECLINED
BATHING: INDEPENDENT
PATIENT'S MEMORY ADEQUATE TO SAFELY COMPLETE DAILY ACTIVITIES?: YES

## 2024-01-18 ASSESSMENT — ENCOUNTER SYMPTOMS
ABDOMINAL DISTENTION: 0
ABDOMINAL PAIN: 0
MUSCULOSKELETAL NEGATIVE: 1
PSYCHIATRIC NEGATIVE: 1
CONSTIPATION: 0
RESPIRATORY NEGATIVE: 1
EYES NEGATIVE: 1
VOMITING: 0
ENDOCRINE NEGATIVE: 1
DIARRHEA: 0
HEMATOLOGIC/LYMPHATIC NEGATIVE: 1
NEUROLOGICAL NEGATIVE: 1
NAUSEA: 0
CARDIOVASCULAR NEGATIVE: 1
ALLERGIC/IMMUNOLOGIC NEGATIVE: 1
CONSTITUTIONAL NEGATIVE: 1
GASTROINTESTINAL NEGATIVE: 1

## 2024-01-18 ASSESSMENT — COGNITIVE AND FUNCTIONAL STATUS - GENERAL
DAILY ACTIVITIY SCORE: 24
MOBILITY SCORE: 24
PATIENT BASELINE BEDBOUND: NO
DAILY ACTIVITIY SCORE: 24
MOBILITY SCORE: 24
DAILY ACTIVITIY SCORE: 24
MOBILITY SCORE: 24

## 2024-01-18 ASSESSMENT — PATIENT HEALTH QUESTIONNAIRE - PHQ9
1. LITTLE INTEREST OR PLEASURE IN DOING THINGS: NOT AT ALL
SUM OF ALL RESPONSES TO PHQ9 QUESTIONS 1 & 2: 0
2. FEELING DOWN, DEPRESSED OR HOPELESS: NOT AT ALL

## 2024-01-18 ASSESSMENT — PAIN SCALES - GENERAL
PAINLEVEL_OUTOF10: 5 - MODERATE PAIN
PAINLEVEL_OUTOF10: 2
PAINLEVEL_OUTOF10: 0 - NO PAIN
PAINLEVEL_OUTOF10: 0 - NO PAIN

## 2024-01-18 ASSESSMENT — LIFESTYLE VARIABLES
SUBSTANCE_ABUSE_PAST_12_MONTHS: NO
AUDIT-C TOTAL SCORE: 0
HOW OFTEN DO YOU HAVE 6 OR MORE DRINKS ON ONE OCCASION: NEVER
HOW OFTEN DO YOU HAVE A DRINK CONTAINING ALCOHOL: NEVER
AUDIT-C TOTAL SCORE: 0
SKIP TO QUESTIONS 9-10: 1
PRESCIPTION_ABUSE_PAST_12_MONTHS: NO
HOW MANY STANDARD DRINKS CONTAINING ALCOHOL DO YOU HAVE ON A TYPICAL DAY: PATIENT DOES NOT DRINK

## 2024-01-18 ASSESSMENT — COLUMBIA-SUICIDE SEVERITY RATING SCALE - C-SSRS
2. HAVE YOU ACTUALLY HAD ANY THOUGHTS OF KILLING YOURSELF?: NO
6. HAVE YOU EVER DONE ANYTHING, STARTED TO DO ANYTHING, OR PREPARED TO DO ANYTHING TO END YOUR LIFE?: NO
1. IN THE PAST MONTH, HAVE YOU WISHED YOU WERE DEAD OR WISHED YOU COULD GO TO SLEEP AND NOT WAKE UP?: NO

## 2024-01-18 ASSESSMENT — PAIN - FUNCTIONAL ASSESSMENT
PAIN_FUNCTIONAL_ASSESSMENT: 0-10
PAIN_FUNCTIONAL_ASSESSMENT: 0-10

## 2024-01-18 NOTE — ANESTHESIA PROCEDURE NOTES
Airway  Date/Time: 1/18/2024 1:24 PM  Urgency: elective    Airway not difficult    Staffing  Performed: MICK   Authorized by: SHMUEL Jackson    Performed by: SHMUEL Jackson  Patient location during procedure: OR    Indications and Patient Condition  Indications for airway management: anesthesia  Spontaneous Ventilation: absent  Sedation level: deep  Preoxygenated: yes  Patient position: reverse Trendelenburg  MILS maintained throughout  Mask difficulty assessment: 1 - vent by mask  Planned trial extubation    Final Airway Details  Final airway type: endotracheal airway      Successful airway: ETT  Cuffed: yes   Successful intubation technique: video laryngoscopy  Facilitating devices/methods: intubating stylet  Endotracheal tube insertion site: oral  Blade type: Seymour.  Blade size: #3  ETT size (mm): 7.5  Cormack-Lehane Classification: grade I - full view of glottis  Placement verified by: chest auscultation and capnometry   Cuff volume (mL): 8  Measured from: teeth  ETT to teeth (cm): 23  Number of attempts at approach: 1  Number of other approaches attempted: 0

## 2024-01-18 NOTE — ANESTHESIA PREPROCEDURE EVALUATION
Patient: Antony Pizarro    Procedure Information       Date/Time: 01/18/24 1245    Procedure: REPAIR DIAPHRAGMATIC HERNIA LAPAROSCOPY, LES AUGMENTATION- LINX    Location: [facility] OR [facility] / [facility]    Surgeons: Sonia Roth MD MPH          Vitals:    01/18/24 1119   BP: 164/78   Pulse: 63   Resp: 18   Temp: 36 °C (96.8 °F)   SpO2: 100%       Past Surgical History:   Procedure Laterality Date    CARDIAC SURGERY  05/31/2017    Heart Surgery    CHOLECYSTECTOMY  05/31/2017    Cholecystectomy    COLONOSCOPY  05/31/2017    Colonoscopy (Fiberoptic)    OTHER SURGICAL HISTORY  12/04/2019    Rotator cuff repair    OTHER SURGICAL HISTORY  10/25/2018    History Of Prior Surgery    SINUS SURGERY  05/31/2017    Sinus Surgery    TONSILLECTOMY  05/31/2017    Tonsillectomy     Past Medical History:   Diagnosis Date    Calderon's esophagus     CAD (coronary artery disease)     GERD (gastroesophageal reflux disease)     Heart disease     Hepatic steatosis     Personal history of diseases of the blood and blood-forming organs and certain disorders involving the immune mechanism     History of ITP    Personal history of other diseases of the circulatory system     History of cardiac disorder    Personal history of other diseases of the circulatory system     History of coronary artery disease    Personal history of other diseases of the digestive system     History of gastroesophageal reflux (GERD)    Personal history of other specified conditions     History of chest pain    Sinusitis        Current Facility-Administered Medications:     ceFAZolin in dextrose (iso-os) (Ancef) IVPB 2 g, 2 g, intravenous, Once, Charu Ventura MD    lactated Ringer's infusion, 100 mL/hr, intravenous, Continuous, Rudy Estrada MD    lactated Ringer's infusion, 100 mL/hr, intravenous, Continuous, Charu Ventura MD    scopolamine (Transderm-Scop) patch 1 patch, 1 patch, transdermal, Once, Charu Ventura MD  Prior to Admission medications    Medication  Sig Start Date End Date Taking? Authorizing Provider   ascorbic acid, vitamin C, crystals Take by mouth.    Historical Provider, MD   atorvastatin (Lipitor) 10 mg tablet Take 1 tablet (10 mg) by mouth once a day on Monday, Wednesday, and Friday. 10/30/23   Silvestre Faust MD   azelastine (Astelin) 137 mcg (0.1 %) nasal spray Administer 1 spray into each nostril 2 times a day. Use in each nostril as directed 12/5/23 12/4/24  Preston De Leon MD   cholecalciferol (Vitamin D-3) 125 MCG (5000 UT) capsule Take by mouth. 12/5/19   Historical Provider, MD   fluticasone (Flonase) 50 mcg/actuation nasal spray Administer 1 spray into each nostril 2 times a day. Shake gently. Before first use, prime pump. After use, clean tip and replace cap. 12/5/23 12/4/24  Preston De Leon MD   magnesium oxide (Mag-Ox) 400 mg (241.3 mg magnesium) tablet Take by mouth. 12/5/19   Historical Provider, MD   omega-3 fatty acids (FISH OIL CONCENTRATE ORAL) Take by mouth.    Historical Provider, MD   omeprazole (PriLOSEC) 40 mg DR capsule Take 1 capsule (40 mg) by mouth 2 times a day. 3/23/23   Historical Provider, MD   ubidecarenone/vitamin E (COENZYME Q10-VITAMIN E ORAL) Take by mouth.    Historical Provider, MD   zinc sulfate (Zincate) 220 (50 Zn) MG capsule Take by mouth.    Historical Provider, MD   loratadine (Claritin Liqui-Gel) 10 mg capsule Take by mouth. 12/16/22 1/16/24  Historical Provider, MD     Allergies   Allergen Reactions    Doxycycline Unknown    Egg Unknown    House Dust Mite Other    Lactase Other     Congestion, sore throat    Milk Containing Products (Dairy) Unknown    Mold Unknown    Tomato Unknown     Social History     Tobacco Use    Smoking status: Never    Smokeless tobacco: Never   Substance Use Topics    Alcohol use: Yes     Comment: Socially         Chemistry    Lab Results   Component Value Date/Time     01/09/2024 0659    K 3.8 01/09/2024 0659     01/09/2024 0659    CO2 31 01/09/2024 0659    BUN 17  01/09/2024 0659    CREATININE 1.04 01/09/2024 0659    Lab Results   Component Value Date/Time    CALCIUM 9.4 01/09/2024 0659    ALKPHOS 61 01/09/2024 0659    AST 25 01/09/2024 0659    ALT 25 01/09/2024 0659    BILITOT 1.1 01/09/2024 0659          Lab Results   Component Value Date/Time    WBC 4.3 (L) 01/09/2024 0659    HGB 15.5 01/09/2024 0659    HCT 46.4 01/09/2024 0659     01/09/2024 0659     Lab Results   Component Value Date/Time    PROTIME 11.3 01/09/2024 0659    INR 1.0 01/09/2024 0659     No results found for this or any previous visit (from the past 4464 hour(s)).  No results found for this or any previous visit from the past 1095 days.      Relevant Problems   Cardiovascular   (+) Abnormal EKG   (+) Coronary artery disease   (+) Hyperlipidemia      GI   (+) GERD (gastroesophageal reflux disease)      Hematology   (+) Chronic ITP (idiopathic thrombocytopenia) (CMS/[facility])       Clinical information reviewed:       Med Hx  Surg Hx            NPO Detail:  No data recorded     Physical Exam    Airway  Mallampati: II  TM distance: >3 FB  Neck ROM: full     Cardiovascular - normal exam  Rhythm: regular  Rate: normal     Dental    Pulmonary   Breath sounds clear to auscultation     Abdominal   Abdomen: soft  Bowel sounds: normal           Anesthesia Plan    History of general anesthesia?: yes  History of complications of general anesthesia?: no    ASA 2     general     The patient is not a current smoker.  Patient was not previously instructed to abstain from smoking on day of procedure.  Patient did not smoke on day of procedure.    intravenous induction   Anesthetic plan and risks discussed with patient.    Plan discussed with CRNA.

## 2024-01-18 NOTE — ANESTHESIA POSTPROCEDURE EVALUATION
Patient: Antony Pizarro    Procedure Summary       Date: 01/18/24 Room / Location: [facility] OR [facility] / [facility]    Anesthesia Start: 1300 Anesthesia Stop: 1518    Procedure: REPAIR DIAPHRAGMATIC HERNIA LAPAROSCOPY, LES AUGMENTATION- LINX (Abdomen) Diagnosis:       Gastroesophageal reflux disease, unspecified whether esophagitis present      (Gastroesophageal reflux disease, unspecified whether esophagitis present [K21.9])    Surgeons: Sonia Roth MD MPH Responsible Provider: SHMUEL Jackson    Anesthesia Type: general ASA Status: 2            Anesthesia Type: general    Vitals Value Taken Time   /62 01/18/24 1619   Temp 36.4 °C (97.5 °F) 01/18/24 1520   Pulse 95 01/18/24 1629   Resp 18 01/18/24 1605   SpO2 98 % 01/18/24 1629   Vitals shown include unvalidated device data.    Anesthesia Post Evaluation    Patient location during evaluation: PACU  Patient participation: complete - patient participated  Level of consciousness: awake  Pain management: adequate  Multimodal analgesia pain management approach  Airway patency: patent  Two or more strategies used to mitigate risk of obstructive sleep apnea  Cardiovascular status: acceptable  Respiratory status: acceptable  Hydration status: acceptable  Postoperative Nausea and Vomiting: none        No notable events documented.

## 2024-01-18 NOTE — BRIEF OP NOTE
Date: 2024  OR Location: [facility]    Name: Antony Pizarro, : 1960, Age: 63 y.o., MRN: [id number], Sex: male    Diagnosis  Pre-op Diagnosis     * Gastroesophageal reflux disease, unspecified whether esophagitis present [K21.9] Post-op Diagnosis     * Gastroesophageal reflux disease, unspecified whether esophagitis present [K21.9]     Procedures  REPAIR DIAPHRAGMATIC HERNIA LAPAROSCOPY, LES AUGMENTATION- LINX  84007 - AL LAPS RPR PARAESPHGL HRNA INCL FUNDPLSTY W/MESH      Surgeons      * Sonia Roth - Primary    Resident/Fellow/Other Assistant:  Surgeon(s) and Role:     * Charu Ventura MD - Assisting    Procedure Summary  Anesthesia: Consult  ASA: II  Anesthesia Staff: CRNA: RUIZ Jackson-CRNA  Estimated Blood Loss: 2 mL  Intra-op Medications:   Medication Name Total Dose   BUPivacaine-EPINEPHrine (Marcaine w/EPI) 0.5 %-1:200,000 injection 27 mL   sodium chloride 0.9 % irrigation solution 1,000 mL   sodium chloride (PF) 0.9% solution 27 mL   lactated Ringer's infusion 141.67 mL              Anesthesia Record               Intraprocedure I/O Totals          Intake    Propofol Drip 0.00 mL    The total shown is the total volume documented since Anesthesia Start was filed.    lactated Ringer's infusion 2400.00 mL    Total Intake 2400 mL       Output    Est. Blood Loss 15 mL    Total Output 15 mL       Net    Net Volume 2385 mL          Specimen: No specimens collected     Staff:   Circulator: Deanne Godinez RN; Antnoy Driscoll RN  Scrub Person: Evon Galloway          Findings: size 15 Linx magnets used    Complications:  None; patient tolerated the procedure well.     Disposition: PACU - hemodynamically stable.  Condition: stable  Specimens Collected: No specimens collected  Attending Attestation:     Sonia Roth  Phone Number: [phone]

## 2024-01-18 NOTE — CARE PLAN
The patient's goals for the shift include  pain control    The clinical goals for the shift include pain control and ambulation

## 2024-01-18 NOTE — H&P
History Of Present Illness  Antony Pizarro is a 63 y.o. male with longstanding sinus problems and also severe GERD. His first scope was in 2005 and he has had several since. 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 He has increased stool burden on CT but does not complain of constipation. He notes siginificant symptoms despite daily PPI use. This is severely affecting his quality of life. b On GERD score regurg 15 despite being on PPI. HB controlled with the meds. Trying to eat smaller amounts. His primary symptom is regurgitation and it seems this is affecting the oropharynx. We discussed the options for management including Stretta, TIF, Hiatal hernia repair and LES augmentation. Knowing the severity of symtpoms, we discussed hiatal hernia repair with LES augmentation. We discussed fundoplication versus Linx at length. The risks and benefits of each were discussed. we together determined Linx to be optimal for his augmentation as it will allow for a quicker recovery, he has good esophageal motility, and much of the issue is also bloating and this will allow for more ability to belch. Also advised to drink more water to help with stool burden. Finally risks and benefits of procedure reviewed at length.      Past Medical History  He has a past medical history of Calderon's esophagus, CAD (coronary artery disease), GERD (gastroesophageal reflux disease), Heart disease, Hepatic steatosis, Personal history of diseases of the blood and blood-forming organs and certain disorders involving the immune mechanism, Personal history of other diseases of the circulatory system, Personal history of other diseases of the circulatory system, Personal history of other diseases of the digestive system, Personal history of other specified conditions, and Sinusitis.    Surgical History  He has a past surgical history that includes  Colonoscopy (05/31/2017); Tonsillectomy (05/31/2017); Sinus surgery (05/31/2017); Cardiac surgery (05/31/2017); Cholecystectomy (05/31/2017); Other surgical history (12/04/2019); and Other surgical history (10/25/2018).     Social History  He reports that he has never smoked. He has never used smokeless tobacco. He reports current alcohol use. He reports that he does not use drugs.    Family History  Family History   Problem Relation Name Age of Onset    Coronary artery disease Father      Other (cardiac disorder) Father      Heart failure Father          Allergies  Doxycycline, House dust mite, Lactase, Milk containing products (dairy), Mold, and Tomato    Review of Systems   Constitutional: Negative.    HENT: Negative.     Eyes: Negative.    Respiratory: Negative.     Cardiovascular: Negative.    Gastrointestinal: Negative.  Negative for abdominal distention, abdominal pain, constipation, diarrhea, nausea and vomiting.   Endocrine: Negative.    Genitourinary: Negative.    Musculoskeletal: Negative.    Skin: Negative.    Allergic/Immunologic: Negative.    Neurological: Negative.    Hematological: Negative.    Psychiatric/Behavioral: Negative.          Physical Exam  Vitals reviewed.   Constitutional:       Appearance: Normal appearance. He is obese.   HENT:      Head: Normocephalic and atraumatic.      Nose: Nose normal.   Eyes:      Extraocular Movements: Extraocular movements intact.      Pupils: Pupils are equal, round, and reactive to light.   Cardiovascular:      Rate and Rhythm: Normal rate and regular rhythm.   Pulmonary:      Effort: Pulmonary effort is normal.   Abdominal:      General: Abdomen is flat. There is no distension.      Palpations: Abdomen is soft. There is no mass.      Tenderness: There is no abdominal tenderness.   Musculoskeletal:         General: Normal range of motion.      Cervical back: Normal range of motion and neck supple.   Skin:     General: Skin is warm and dry.      Capillary  Refill: Capillary refill takes less than 2 seconds.   Neurological:      General: No focal deficit present.      Mental Status: He is alert and oriented to person, place, and time.   Psychiatric:         Mood and Affect: Mood normal.         Behavior: Behavior normal.         Thought Content: Thought content normal.         Judgment: Judgment normal.          Last Recorded Vitals  Blood pressure 164/78, pulse 63, temperature 36 °C (96.8 °F), resp. rate 18, height 1.829 m (6'), weight 92 kg (202 lb 13.2 oz), SpO2 100 %.    Relevant Results             Assessment/Plan   Principal Problem:    GERD (gastroesophageal reflux disease)  Active Problems:    Heartburn    63M here with GERD and regurge to undergo hernia repair with linx    -NPO, IVF  -Consented  -inpatient      Charu Ventura MD

## 2024-01-19 ENCOUNTER — APPOINTMENT (OUTPATIENT)
Dept: RADIOLOGY | Facility: HOSPITAL | Age: 64
DRG: 328 | End: 2024-01-19
Payer: COMMERCIAL

## 2024-01-19 ENCOUNTER — PHARMACY VISIT (OUTPATIENT)
Dept: PHARMACY | Facility: CLINIC | Age: 64
End: 2024-01-19
Payer: COMMERCIAL

## 2024-01-19 VITALS
DIASTOLIC BLOOD PRESSURE: 48 MMHG | SYSTOLIC BLOOD PRESSURE: 137 MMHG | HEART RATE: 70 BPM | RESPIRATION RATE: 16 BRPM | OXYGEN SATURATION: 95 % | TEMPERATURE: 98.1 F | HEIGHT: 72 IN | BODY MASS INDEX: 27.47 KG/M2 | WEIGHT: 202.82 LBS

## 2024-01-19 LAB
ALBUMIN SERPL BCP-MCNC: 3.7 G/DL (ref 3.4–5)
ALP SERPL-CCNC: 55 U/L (ref 33–136)
ALT SERPL W P-5'-P-CCNC: 61 U/L (ref 10–52)
ANION GAP SERPL CALC-SCNC: 9 MMOL/L (ref 10–20)
AST SERPL W P-5'-P-CCNC: 46 U/L (ref 9–39)
BACTERIA SPEC RESP CULT: ABNORMAL
BACTERIA SPEC RESP CULT: ABNORMAL
BILIRUB SERPL-MCNC: 1.4 MG/DL (ref 0–1.2)
BUN SERPL-MCNC: 10 MG/DL (ref 6–23)
CALCIUM SERPL-MCNC: 8.8 MG/DL (ref 8.6–10.3)
CHLORIDE SERPL-SCNC: 105 MMOL/L (ref 98–107)
CO2 SERPL-SCNC: 30 MMOL/L (ref 21–32)
CREAT SERPL-MCNC: 1.02 MG/DL (ref 0.5–1.3)
EGFRCR SERPLBLD CKD-EPI 2021: 83 ML/MIN/1.73M*2
ERYTHROCYTE [DISTWIDTH] IN BLOOD BY AUTOMATED COUNT: 12.1 % (ref 11.5–14.5)
GLUCOSE SERPL-MCNC: 98 MG/DL (ref 74–99)
GRAM STN SPEC: ABNORMAL
GRAM STN SPEC: ABNORMAL
HCT VFR BLD AUTO: 41.4 % (ref 41–52)
HGB BLD-MCNC: 13.9 G/DL (ref 13.5–17.5)
MCH RBC QN AUTO: 31 PG (ref 26–34)
MCHC RBC AUTO-ENTMCNC: 33.6 G/DL (ref 32–36)
MCV RBC AUTO: 92 FL (ref 80–100)
NRBC BLD-RTO: 0 /100 WBCS (ref 0–0)
PLATELET # BLD AUTO: 149 X10*3/UL (ref 150–450)
POTASSIUM SERPL-SCNC: 4.1 MMOL/L (ref 3.5–5.3)
PROT SERPL-MCNC: 5.7 G/DL (ref 6.4–8.2)
RBC # BLD AUTO: 4.48 X10*6/UL (ref 4.5–5.9)
SODIUM SERPL-SCNC: 140 MMOL/L (ref 136–145)
WBC # BLD AUTO: 10.1 X10*3/UL (ref 4.4–11.3)

## 2024-01-19 PROCEDURE — 2500000001 HC RX 250 WO HCPCS SELF ADMINISTERED DRUGS (ALT 637 FOR MEDICARE OP): Performed by: STUDENT IN AN ORGANIZED HEALTH CARE EDUCATION/TRAINING PROGRAM

## 2024-01-19 PROCEDURE — C9113 INJ PANTOPRAZOLE SODIUM, VIA: HCPCS | Performed by: STUDENT IN AN ORGANIZED HEALTH CARE EDUCATION/TRAINING PROGRAM

## 2024-01-19 PROCEDURE — 84075 ASSAY ALKALINE PHOSPHATASE: CPT | Performed by: STUDENT IN AN ORGANIZED HEALTH CARE EDUCATION/TRAINING PROGRAM

## 2024-01-19 PROCEDURE — 36415 COLL VENOUS BLD VENIPUNCTURE: CPT | Performed by: STUDENT IN AN ORGANIZED HEALTH CARE EDUCATION/TRAINING PROGRAM

## 2024-01-19 PROCEDURE — 85027 COMPLETE CBC AUTOMATED: CPT | Performed by: STUDENT IN AN ORGANIZED HEALTH CARE EDUCATION/TRAINING PROGRAM

## 2024-01-19 PROCEDURE — RXMED WILLOW AMBULATORY MEDICATION CHARGE

## 2024-01-19 PROCEDURE — 74018 RADEX ABDOMEN 1 VIEW: CPT

## 2024-01-19 PROCEDURE — 2500000004 HC RX 250 GENERAL PHARMACY W/ HCPCS (ALT 636 FOR OP/ED): Performed by: STUDENT IN AN ORGANIZED HEALTH CARE EDUCATION/TRAINING PROGRAM

## 2024-01-19 PROCEDURE — 2550000001 HC RX 255 CONTRASTS: Performed by: SURGERY

## 2024-01-19 PROCEDURE — 74018 RADEX ABDOMEN 1 VIEW: CPT | Performed by: RADIOLOGY

## 2024-01-19 RX ORDER — CEFTRIAXONE 2 G/50ML
2 INJECTION, SOLUTION INTRAVENOUS ONCE
Status: COMPLETED | OUTPATIENT
Start: 2024-01-19 | End: 2024-01-19

## 2024-01-19 RX ORDER — OXYCODONE HCL 5 MG/5 ML
5 SOLUTION, ORAL ORAL EVERY 6 HOURS PRN
Qty: 140 ML | Refills: 0 | Status: SHIPPED | OUTPATIENT
Start: 2024-01-19 | End: 2024-01-24 | Stop reason: ALTCHOICE

## 2024-01-19 RX ORDER — OMEPRAZOLE 40 MG/1
40 CAPSULE, DELAYED RELEASE ORAL
Qty: 30 CAPSULE | Refills: 3 | Status: SHIPPED | OUTPATIENT
Start: 2024-01-19 | End: 2024-03-13 | Stop reason: ALTCHOICE

## 2024-01-19 RX ORDER — ONDANSETRON 4 MG/1
4 TABLET, ORALLY DISINTEGRATING ORAL EVERY 6 HOURS PRN
Qty: 60 TABLET | Refills: 1 | Status: SHIPPED | OUTPATIENT
Start: 2024-01-19 | End: 2024-01-24 | Stop reason: ALTCHOICE

## 2024-01-19 RX ADMIN — PANTOPRAZOLE SODIUM 40 MG: 40 INJECTION, POWDER, FOR SOLUTION INTRAVENOUS at 08:15

## 2024-01-19 RX ADMIN — CEFTRIAXONE SODIUM 2 G: 2 INJECTION, SOLUTION INTRAVENOUS at 13:16

## 2024-01-19 RX ADMIN — DIATRIZOATE MEGLUMINE AND DIATRIZOATE SODIUM 30 ML: 660; 100 LIQUID ORAL; RECTAL at 08:40

## 2024-01-19 RX ADMIN — ACETAMINOPHEN 650 MG: 160 SOLUTION ORAL at 11:32

## 2024-01-19 RX ADMIN — ACETAMINOPHEN 650 MG: 160 SOLUTION ORAL at 17:17

## 2024-01-19 RX ADMIN — GABAPENTIN 300 MG: 300 CAPSULE ORAL at 08:15

## 2024-01-19 RX ADMIN — GABAPENTIN 300 MG: 300 CAPSULE ORAL at 15:29

## 2024-01-19 ASSESSMENT — COGNITIVE AND FUNCTIONAL STATUS - GENERAL
DAILY ACTIVITIY SCORE: 24
MOBILITY SCORE: 24

## 2024-01-19 ASSESSMENT — PAIN DESCRIPTION - LOCATION: LOCATION: ABDOMEN

## 2024-01-19 ASSESSMENT — PAIN SCALES - GENERAL
PAINLEVEL_OUTOF10: 0 - NO PAIN
PAINLEVEL_OUTOF10: 4
PAINLEVEL_OUTOF10: 5 - MODERATE PAIN

## 2024-01-19 ASSESSMENT — ACTIVITIES OF DAILY LIVING (ADL): LACK_OF_TRANSPORTATION: NO

## 2024-01-19 ASSESSMENT — PAIN - FUNCTIONAL ASSESSMENT
PAIN_FUNCTIONAL_ASSESSMENT: 0-10
PAIN_FUNCTIONAL_ASSESSMENT: 0-10

## 2024-01-19 NOTE — PROGRESS NOTES
01/19/24 1115   Discharge Planning   Living Arrangements Spouse/significant other   Support Systems Spouse/significant other;Family members;Friends/neighbors   Assistance Needed Patient is A&O X3, on room air at baseline, is independent with ADLs and uses no DME at home. Patient denies further needs upon discharge.   Type of Residence Private residence   Number of Stairs to Enter Residence 0   Number of Stairs Within Residence 13  (Bedroom on first floor)   Who is requesting discharge planning? Provider   Home or Post Acute Services None   Patient expects to be discharged to: Home no needs   Does the patient need discharge transport arranged? No   Financial Resource Strain   How hard is it for you to pay for the very basics like food, housing, medical care, and heating? Not hard   Housing Stability   In the last 12 months, was there a time when you were not able to pay the mortgage or rent on time? N   In the last 12 months, how many places have you lived? 1   In the last 12 months, was there a time when you did not have a steady place to sleep or slept in a shelter (including now)? N   Transportation Needs   In the past 12 months, has lack of transportation kept you from medical appointments or from getting medications? no   In the past 12 months, has lack of transportation kept you from meetings, work, or from getting things needed for daily living? No

## 2024-01-19 NOTE — DISCHARGE INSTRUCTIONS
Medications:  - Take 650mg Tylenol (liquid preferred) every 6 hours for pain. Take between doses of your opioid pain medication. Try to limit the use of your opioid pain medication and only take as needed.  - If you have medications that you still cannot tolerate by your first visit with your surgeon or dietitian, please discuss this.   - You should be receiving or already have received the following medications for your postoperative course:  antinausea medication (ondansetron, metoclopramide), and pain medication (oxycodone, morphine, hydromorphone, hydrocodone). If you are missing any of these, please call the office immediately so we can prescribe them for you.    Constipation  - Take fiber (benefiber or metamucil) twice daily. Please also take colace (docusate) twice a day while taking oxycodone or other opiates. If you remain constipated (no bowel movement for 2 days) despite these medications, please take milk of magnesia and call the office to notify us.    Activity instructions:   - No lifting, pulling, or pushing objects greater than 15 pounds for 6weeks to 3 months for first time operation, if this redo case or revision then you have to wait 3-6 mouths with weight restriction   - Activity otherwise as tolerated.   - You may shower. Soap and water may run over your incisions. Pat dry. No submerging in baths or  swimming (activities that keep your incisions underwater) for at least two weeks.    - You may not drive while taking narcotics.     Diet:   ? You are encouraged to eat the day of surgery. This will activate the LINX® band right  away, allowing it to open and close with the passing of food, and help to prevent scar  tissue from forming.  ? Eat soft foods the day of surgery. You can resume your regular diet the day after surgery  if tolerated. It is acceptable to continue to eat soft foods, but do not consume only  pureed foods or liquids.  ? Cut foods into small pieces, make them moist, eat slowly,  and take small bites of food  chewing eat bite thoroughly.  How Often Should I Eat?  ? To help prevent dysphagia, it is recommended that you eat one tablespoon of yogurt,  pudding, custard or applesauce or one cracker every 2-3 hours while you are awake. It is  best to choose foods higher in protein since portions are small, and meeting nutrient  needs will contribute to healing. This could be Greek yogurt, a cheese stick, egg salad or  a protein bar that you can tolerate.  ? Eating every 2-3 hours may be discontinued at 6 to 8 weeks post operatively.    What about Fluids?  ? Cold fluids (and foods) may cause esophageal spasms and contribute to regurgitation.  You may be more successful with room temperature or warm fluids.  ? Take a few small sips of warm fluids prior to a meal. If you experience food sticking, sit  up straight or stand up and walk around allowing time for food to pass. Tiny sips of  fluids may be taken as necessary; however, excess fluid intake with meals may lead to  regurgitation.  ? Avoid taking large gulps/amounts of drinks at one time as this may cause tightness or  discomfort. Stay hydrated by drinking regular sips of water throughout the day.  ? You may experience food stasis, or having tiny particles of food remaining in the  esophagus. This can cause a sensation similar to heartburn, especially with tomato,  citrus, and other irritants. Food stasis may cause excess phlegm and mucous. After  meals, it is best to sip warm fluids to clear any remaining particles.  ? You may want to take pills with yogurt, pudding, applesauce or other soft food rather  than fluids to help prevent regurgitation.    How Do I Prevent Gas?  ? During surgery, air is passed into the body and it may pocket in your shoulders, chest,  back or neck causing discomfort.  ? Avoid taking large bites of food and gulping air as you swallow, chewing with your  mouth open, chewing gum or tobacco, eating hard candy, drinking  carbonated  beverages and drinking through a straw.  ? Limit or avoid gas-forming foods including corn, asparagus, broccoli, Brussel sprouts,  cabbage, cauliflower, dried beans and peas, lentils and other legumes, soy, and sugar  alcohols.  ? You may have trouble tolerating some fruits and milk/milk products after surgery. If you  find that milk or milk products cause bloating, abdominal pain or diarrhea, you may  consider Lactaid milk or an alternative such as almond or rice milk or use Dairy Ease  which provides an enzyme that may help you to digest milk sugar (lactose).    Will I Have Trouble Swallowing?  ? You may experience dysphagia, or difficulty swallowing after your procedure.  ? This may start to occur right away after surgery and up to four weeks after your  procedure. It will usually resolve within 12 weeks.  ? You may have swelling from the hiatal hernia repair which may make swallowing liquids  more difficult than swallowing solid foods. If this occurs, sip fluids slowly between meals  throughout the day to prevent dehydration.  ? You may experience regurgitation if you eat or drink too quickly.  ? You may need to eat very slowly during this time, pausing 30 seconds to a minute  between bites of food. Soft, moist foods may be better tolerated during this time.  ? Continue to eat one tablespoon of yogurt, pudding, custard or applesauce or one  cracker every hour while you are awake.  ? Contact your doctor if you are regurgitating all foods and fluids or if you have persistent  and severe discomfort.  Tips:  Avoid eating steak for 6-8 weeks after surgery.  Avoid meats that are dry for 3 weeks after your procedure. Cut meat, pork, poultry, or fish into  small pieces. Prepare these foods with gravy, cream soups, cheese, avocado, or mayonnaise  and prepare them with moist cooking techniques, such as crock pot or braising.  Avoid eating breads, toast, crust, and pizza for 3 weeks after your procedure.  You  may have difficulty eating pasta and rice. Short noodles such as macaroni and cheese and  rice in soups are usually better tolerated.  Avoid eating food in casings such as sausage links. You may have difficulty tolerating caputo,  sausage, salad, cucumbers, peppers and peanut butter for 3-6 weeks after surgery.     Call Provider If:  - Breathing faster or harder than normal.   - Fever of 100.4 F (38 C) or higher or feeling of chills.   - Feeling very sleepy and difficult to awaken.   - Inability to drink or significant decrease in ability to drink since discharge.   - Vomiting (throwing up) and not able to eat or drink for 12 hours.   - Urinating much less than your normal.  - More than 4 loose, watery bowel movements in 24 hours (diarrhea).   - Any new concerning symptoms.

## 2024-01-19 NOTE — CARE PLAN
The patient's goals for the shift include    Problem: Pain  Goal: Takes deep breaths with improved pain control throughout the shift  1/18/2024 2314 by Gricelda Schmitt RN  Outcome: Progressing  1/18/2024 2314 by Gricelda Schmitt RN  Outcome: Progressing       The clinical goals for the shift include pain control    No barriers

## 2024-01-19 NOTE — CARE PLAN
The patient's goals for the shift include  pain control    The clinical goals for the shift include pain control      Problem: Pain  Goal: Takes deep breaths with improved pain control throughout the shift  Outcome: Progressing  Goal: Turns in bed with improved pain control throughout the shift  Outcome: Progressing  Goal: Walks with improved pain control throughout the shift  Outcome: Progressing  Goal: Performs ADL's with improved pain control throughout shift  Outcome: Progressing  Goal: Participates in PT with improved pain control throughout the shift  Outcome: Progressing  Goal: Free from opioid side effects throughout the shift  Outcome: Progressing  Goal: Free from acute confusion related to pain meds throughout the shift  Outcome: Progressing     Problem: Pain  Goal: My pain/discomfort is manageable  Outcome: Progressing     Problem: Safety  Goal: Patient will be injury free during hospitalization  Outcome: Progressing  Goal: I will remain free of falls  Outcome: Progressing     Problem: Daily Care  Goal: Daily care needs are met  Outcome: Progressing     Problem: Psychosocial Needs  Goal: Demonstrates ability to cope with hospitalization/illness  Outcome: Progressing  Goal: Collaborate with me, my family, and caregiver to identify my specific goals  Outcome: Progressing     Problem: Discharge Barriers  Goal: My discharge needs are met  Outcome: Progressing

## 2024-01-19 NOTE — PROGRESS NOTES
GENERAL SURGERY CLINIC  POST-OP FOLLOW UP NOTE  Clinic Date: 1/24/24    Antony Pizarro, 63 y.o.  MRN: [id number]    Date of Surgery: 1/18/24  Surgical Procedure: REPAIR DIAPHRAGMATIC HERNIA LAPAROSCOPY, LES AUGMENTATION- LINX   Extra Procedures: none  COMPLICATIONS DURING ADMISSION: None    Surgeon Attending: Dr. Sonia Roth    Last Impression Visit Note:  9/2023: 62 yo male with longstanding sinus problems and also severe GERD. His first scope was in 2005 and he has had several since. 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 He has increased stool burden on CT but does not complain of constipation. He notes siginificant symptoms despite daily PPI use. This is severely affecting his quality of life. b On GERD score regurg 15 despite being on PPI. HB controlled with the meds. Trying to eat smaller amounts. His primary symptom is regurgitation and it seems this is affecting the oropharynx. We discussed the options for management including Stretta, TIF, Hiatal hernia repair and LES augmentation. Knowing the severity of symtpoms, we discussed hiatal hernia repair with LES augmentation. We discussed fundoplication versus Linx at length. The risks and benefits of each were discussed. we together determined Linx to be optimal for his augmentation as it will allow for a quicker recovery, he has good esophageal motility, and much of the issue is also bloating and this will allow for more ability to belch. Also advised to drink more water to help with stool burden. Finally risks and benefits of procedure reviewed at length.      OP Note: Findings: hiatal hernia     Presenting Today For:  Time Since Surgery:  1  week FUV.  Verbalizes concerns: Food getting stuck.    Feels tired. Was working from home this week. Notes food is firm going down.  Trying to chew well.  Following the list of food. Tolerating meat and turkey. Had a  cracker too fast.  Took a few minutes to get down.  Still some regurg.  Not as much as before but still some.  Pain is ok. Feeling better in this regard.  Gas pain still there.  No nausea    Self Reports:     Abdominal Pain: No    Constipation:  Yes   Decreased Urine Output:  No    Diarrhea:  No    Bloating / Hiccups:  Yes    Nausea/Vomiting: No    Back Pain: No    Calf/Thigh pain or swelling: No    Cough / Wheezing: Yes    Fever/Chills: No    Increased Heart Rate: No    Shortness of Breath: No    GERD - Health Related Quality of Life Questionnaire (GERD- HRQL)  On PPI    How bad is the heartburn? 1 = Symptoms noticeable but not bothersome  Heartburn when lying down? 0 = No symptoms  Heartburn when standing up? 1 = Symptoms noticeable but not bothersome  Heartburn after meals? 0 = No symptoms  Does heartburn change your diet? 0 = No symptoms  Does heartburn wake you from sleep? 0 = No symptoms    Do you have difficulty swallowing? 0 = No symptoms  Do you have pain with swallowing? 0 = No symptoms  If you ta1 = Symptoms noticeable but not bothersomeke medication, does this affect your daily life? 0 = No symptoms    How bad is the regurgitation? 1 = Symptoms noticeable but not bothersome  Regurgitation when lying down? 0 = No symptoms  Regurgitation when standing up? 1 = Symptoms noticeable but not bothersome  Regurgitation after meals? 0 = No symptoms  Does regurgitation change your diet? 0 = No symptoms  Does regurgitation wake you from sleep? 0 = No symptoms    How satisfied are you with your present condition? Satisfied    Total score (calculated by summing the individual scores of questions 1-15):     Greatest possible score 75 (worst symptoms).   Lowest possible score 0 (no symptoms).    Heartburn score (calculated by summing the individual scores of questions 1-6): 2   Worst heartburn symptoms: 30.   No heartburn symptoms: 0.   Score less than or equal to 12 with each individual question not exceeding 2  indicate heartburn elimination.    Regurgitation score (calculated by summing the individual scores of questions 10-15): 2   Worst regurgitation symptoms: 30.   No regurgitation symptoms: 0.   Score less than or equal to 12 with each individual question not exceeding 2 indicate regurgitation elimination.     Current Diet: solid foods staying away from red meat no raw veggies no spicy foods     Current Medications:   No current facility-administered medications for this visit.     Current Outpatient Medications   Medication Sig Dispense Refill    amoxicillin-pot clavulanate (Augmentin) 875-125 mg tablet Take 1 tablet (875 mg) by mouth 2 times a day for 10 days. 20 tablet 0     Facility-Administered Medications Ordered in Other Visits   Medication Dose Route Frequency Provider Last Rate Last Admin    acetaminophen (Tylenol) oral liquid 650 mg  650 mg oral q6h Charu Ventura MD        gabapentin (Neurontin) capsule 300 mg  300 mg oral TID Charu Ventura MD        HYDROmorphone (Dilaudid) injection 0.2 mg  0.2 mg intravenous q3h PRN Charu Ventura MD        ketorolac (Toradol) injection 15 mg  15 mg intravenous q6h Charu Ventura MD        lactated Ringer's infusion  100 mL/hr intravenous Continuous Charu Ventura  mL/hr at 01/18/24 2312 100 mL/hr at 01/18/24 2312    ondansetron (Zofran) tablet 4 mg  4 mg oral q8h PRN Charu Ventura MD        Or    ondansetron (Zofran) injection 4 mg  4 mg intravenous q8h PRN Charu Ventura MD        oxyCODONE (Roxicodone) solution 10 mg  10 mg oral q6h PRN Charu Ventura MD        oxyCODONE (Roxicodone) solution 5 mg  5 mg oral q6h PRN Charu Ventura MD        pantoprazole (ProtoNix) EC tablet 40 mg  40 mg oral Daily before breakfast Charu Ventura MD        Or    pantoprazole (ProtoNix) injection 40 mg  40 mg intravenous Daily before breakfast Charu Ventura MD        prochlorperazine (Compazine) tablet 10 mg  10 mg oral q6h PRN Charu Ventura MD        Or    prochlorperazine  (Compazine) injection 10 mg  10 mg intravenous q6h PRN Charu Ventura MD        Or    prochlorperazine (Compazine) suppository 25 mg  25 mg rectal q12h PRN Charu Ventura MD        scopolamine (Transderm-Scop) patch 1 patch  1 patch transdermal Once Charu Ventura MD   1 patch at 01/18/24 1151    simethicone (Mylicon) drops 40 mg  40 mg oral 4x daily PRN Charu Ventura MD           Visit Vitals  Wt Readings from Last 1 Encounters:   01/18/24 92 kg (202 lb 13.2 oz)    There is no height or weight on file to calculate BMI.    REVIEW OF SYSTEMS:  CONSTITUTIONAL: Patient denies fevers, chills, sweats and weight changes.  EYES: Patient denies any visual symptoms.  EARS, NOSE, AND THROAT: No difficulties with hearing. No symptoms of rhinitis or sore throat.  CARDIOVASCULAR: Patient denies chest pains, palpitations, orthopnea and paroxysmal nocturnal dyspnea.  RESPIRATORY: No dyspnea on exertion, no wheezing or cough.  GI: No nausea, vomiting, diarrhea, constipation, abdominal pain, hematochezia or melena.  : No urinary hesitancy or dribbling. No nocturia or urinary frequency. No abnormal urethral discharge.  MUSCULOSKELETAL: No myalgias or arthralgias.  NEUROLOGIC: No chronic headaches, no seizures. Patient denies numbness, tingling or weakness.  PSYCHIATRIC: Patient denies problems with mood disturbance. No problems with anxiety.  ENDOCRINE: No excessive urination or excessive thirst.  DERMATOLOGIC: Patient denies any rashes or skin changes.    PHYSICAL EXAM:  PHYSICAL EXAMINATION:  GENERAL: No apparent distress. Pt is alert and oriented x3.  VITAL SIGNS: HR, BP, Temp; Normal  HEENT: Head is normocephalic and atraumatic. Extraocular muscles are intact. Pupils are equal, round, and reactive to light and accommodation. Nares appeared normal. Mouth is well hydrated and without lesions. Mucous membranes are moist. Posterior pharynx clear of any exudate or lesions.  NECK: Supple. No carotid bruits. No lymphadenopathy or  thyromegaly.  LUNGS: Clear to auscultation.  HEART: Regular rate and rhythm without murmur.  ABDOMEN: Soft, appropriately mildly tender to palpation, and nondistended. Positive bowel sounds. No hepatosplenomegaly was noted. Incisions CDI. Dressings in place and look great. Asymptomatic umbilical hernia that is incarcerated.   EXTREMITIES: Without any cyanosis, clubbing, rash, lesions or edema.  NEUROLOGIC: Cranial nerves II through XII are grossly intact.  PSYCHIATRIC: Flat affect, but denies suicidal or homicidal ideations.  SKIN: No ulceration or induration present.      IMPRESSION: 62 yo male 5 days post hiatal hernia repair with mesh and LES augmentation with linx.  Overall he is doing great.  Counseled on the dysphagia that this will improve.  His sinuses are bothersome.  Reassured his symptoms of dysphagia will improve and his reflux symtpms are better but not gone.  He is having left shoulder pain and reassured this will improve with time.  He has had some mild bloating.  Reassured this will improve as well.     PLAN:  Chew well and eat slowly  This weekend you can try the bigger pills  Use a heating pad as needed  Make sure you eat every couple of hours.  You can take meds for your sinuses.  Maybe symptomatic management like mucinex.  You can go on antibiotics if needed.  Use Gasx as needed  You can stop the omeprazole in a week.  Take pepcid for a week then tums as needed.   See me in a month  We can fix the belly button when needed  No lifting over 10 lbs for 6 weeks    Dr. Sonia Roth M.D., MPH  Director of Bariatric & Minimally Invasive Surgery  [place] [address]  T:  [phone]  F:  [phone]     Scarlett Card LPN Clinical Nurse General Surgery Program - General Surgery [facility] Patient Nursing Contact  T:  [phone]  F:  [phone] or [phone]

## 2024-01-19 NOTE — DISCHARGE SUMMARY
Discharge Diagnosis  GERD (gastroesophageal reflux disease)    Issues Requiring Follow-Up  Postoperative appointment     Test Results Pending At Discharge  Pending Labs       No current pending labs.            Hospital Course   63F here for longstanding GERD now s/p lap HH repair with linx device meeting discharge criteria.  UGI was unremarkable and was advanced to a soft diet that he tolerated well. Ambulating, voiding spontaneously and pain is under control.    Pertinent Physical Exam At Time of Discharge  Physical Exam  Vitals reviewed.   Constitutional:       Appearance: Normal appearance. He is obese.   HENT:      Head: Normocephalic and atraumatic.      Nose: Nose normal.      Mouth/Throat:      Mouth: Mucous membranes are moist.   Eyes:      Extraocular Movements: Extraocular movements intact.      Pupils: Pupils are equal, round, and reactive to light.   Cardiovascular:      Rate and Rhythm: Normal rate and regular rhythm.   Pulmonary:      Effort: Pulmonary effort is normal.   Abdominal:      General: Distension: mild.      Palpations: Abdomen is soft.      Tenderness: There is abdominal tenderness (appropriate incisional).      Comments: Incisions c/d/I   Musculoskeletal:         General: Normal range of motion.      Cervical back: Normal range of motion and neck supple.   Skin:     General: Skin is warm and dry.      Capillary Refill: Capillary refill takes less than 2 seconds.   Neurological:      General: No focal deficit present.      Mental Status: He is alert.   Psychiatric:         Mood and Affect: Mood normal.         Behavior: Behavior normal.         Thought Content: Thought content normal.         Judgment: Judgment normal.         Home Medications     Medication List      START taking these medications     amoxicillin-pot clavulanate 875-125 mg tablet; Commonly known as:   Augmentin; Take 1 tablet (875 mg) by mouth 2 times a day for 10 days.   cefdinir 300 mg capsule; Commonly known as:  Omnicef; Take 1 capsule (300   mg) by mouth 2 times a day for 10 days.   ondansetron ODT 4 mg disintegrating tablet; Commonly known as:   Zofran-ODT; Take 1 tablet (4 mg) by mouth every 6 hours if needed for   nausea or vomiting.   oxyCODONE 5 mg/5 mL solution; Commonly known as: Roxicodone; Take 5 mL   (5 mg) by mouth every 6 hours if needed for severe pain (7 - 10) or   moderate pain (4 - 6) for up to 7 days.     CHANGE how you take these medications     omeprazole 40 mg DR capsule; Commonly known as: PriLOSEC; Take 1 capsule   (40 mg) by mouth once daily in the morning. Take before meals. Do not   crush or chew. Open capsule, sprinkle beads on SF jello, pudding or   applesauce.; What changed: when to take this, additional instructions     CONTINUE taking these medications     ascorbic acid (vitamin C) crystals   atorvastatin 10 mg tablet; Commonly known as: Lipitor; Take 1 tablet (10   mg) by mouth once a day on Monday, Wednesday, and Friday.   azelastine 137 mcg (0.1 %) nasal spray; Commonly known as: Astelin;   Administer 1 spray into each nostril 2 times a day. Use in each nostril as   directed   cholecalciferol 125 MCG (5000 UT) capsule; Commonly known as: Vitamin   D-3   COENZYME Q10-VITAMIN E ORAL   FISH OIL CONCENTRATE ORAL   fluticasone 50 mcg/actuation nasal spray; Commonly known as: Flonase;   Administer 1 spray into each nostril 2 times a day. Shake gently. Before   first use, prime pump. After use, clean tip and replace cap.   magnesium oxide 400 mg (241.3 mg magnesium) tablet; Commonly known as:   Mag-Ox   zinc sulfate 220 (50 Zn) MG capsule; Commonly known as: Zincate       Outpatient Follow-Up  Future Appointments   Date Time Provider Department Center   1/24/2024  2:30 PM Sonia Roth MD MPH [id number] [facility]   2/2/2024  8:30 AM DO [id number] ALLERGY NURSE1 [id number] [place]   3/4/2024  8:30 AM DO [id number] ALLERGY NURSE1 [id number] [place]   5/8/2024  3:00 PM Silvestre Faust MD [id number] [place]        Charu Ventura MD

## 2024-01-21 PROBLEM — R76.8 WEAK ANTIBODY RESPONSE TO PNEUMOCOCCAL VACCINE: Status: ACTIVE | Noted: 2024-01-21

## 2024-01-21 NOTE — ASSESSMENT & PLAN NOTE
We had a long discussion about treatment options. Ultimately we decided to give another pneumovax and check response. Also, we will see if his infections are lessened post operatively for linx procedure. If infections persist we will revisit immunoglobulin infusions.

## 2024-01-22 ENCOUNTER — APPOINTMENT (OUTPATIENT)
Dept: SURGERY | Facility: CLINIC | Age: 64
End: 2024-01-22
Payer: COMMERCIAL

## 2024-01-22 NOTE — SIGNIFICANT EVENT
Follow Up Phone Call    Outgoing phone call    Spoke to: Antony Pizarro Relationship:self   Phone number: [phone]      Outcome: contacted patient/ family   No chief complaint on file.         Diagnosis:Not applicable    States his dressing is dry and intact, denies fever or chills. Bowels are moving. Swallowing without difficulty. Ambulating. Feeling better, no further questions.

## 2024-01-23 DIAGNOSIS — J32.2 CHRONIC ETHMOIDAL SINUSITIS: ICD-10-CM

## 2024-01-23 RX ORDER — CEFDINIR 300 MG/1
300 CAPSULE ORAL 2 TIMES DAILY
Qty: 60 CAPSULE | Refills: 0 | Status: SHIPPED | OUTPATIENT
Start: 2024-01-23 | End: 2024-02-22

## 2024-01-24 ENCOUNTER — OFFICE VISIT (OUTPATIENT)
Dept: SURGERY | Facility: CLINIC | Age: 64
End: 2024-01-24
Payer: COMMERCIAL

## 2024-01-24 ENCOUNTER — TELEPHONE (OUTPATIENT)
Dept: BARIATRICS | Facility: HOSPITAL | Age: 64
End: 2024-01-24
Payer: COMMERCIAL

## 2024-01-24 VITALS
WEIGHT: 198 LBS | HEIGHT: 72 IN | SYSTOLIC BLOOD PRESSURE: 153 MMHG | HEART RATE: 87 BPM | DIASTOLIC BLOOD PRESSURE: 69 MMHG | BODY MASS INDEX: 26.82 KG/M2

## 2024-01-24 DIAGNOSIS — K21.9 GASTROESOPHAGEAL REFLUX DISEASE, UNSPECIFIED WHETHER ESOPHAGITIS PRESENT: Primary | ICD-10-CM

## 2024-01-24 PROCEDURE — 1036F TOBACCO NON-USER: CPT | Performed by: SURGERY

## 2024-01-24 PROCEDURE — 99024 POSTOP FOLLOW-UP VISIT: CPT | Performed by: SURGERY

## 2024-01-24 NOTE — PATIENT INSTRUCTIONS
PLAN:  Chew well and eat slowly  This weekend you can try the bigger pills  Use a heating pad as needed  Make sure you eat every couple of hours.  You can take meds for your sinuses.  Maybe symptomatic management like mucinex.  You can go on antibiotics if needed.  Use Gasx as needed  You can stop the omeprazole in a week.  Take pepcid for a week then tums as needed.   See me in a month  No jumping for 6 weeks  No lifting over 10 lbs for another 6 weeks      Dr. Sonia Roth M.D., MPH  Director of Bariatric & Minimally Invasive Surgery  [place] [address]  T:  [phone]  F:  [phone]     Scarlett Card LPN Clinical Nurse General Surgery Program - General Surgery [facility] Patient Nursing Contact  T:  [phone]  F:  [phone] or [phone]

## 2024-01-24 NOTE — OP NOTE
"REPAIR DIAPHRAGMATIC HERNIA LAPAROSCOPY, LES AUGMENTATION- LINX Operative Note     Date: 2024  OR Location: [facility] OR    Name: Antony Pizarro \"Raghu\", : 1960, Age: 63 y.o., MRN: [id number], Sex: male    Diagnosis  Pre-op Diagnosis     * Gastroesophageal reflux disease, unspecified whether esophagitis present [K21.9] Post-op Diagnosis     * Gastroesophageal reflux disease, unspecified whether esophagitis present [K21.9]     Procedures  REPAIR DIAPHRAGMATIC HERNIA LAPAROSCOPY, LES AUGMENTATION- LINX  99264 - MA LAPS RPR PARAESPHGL HRNA INCL FUNDPLSTY W/MESH      Surgeons      * Sonia Roth - Primary    Resident/Fellow/Other Assistant:  Surgeon(s) and Role:     * Charu Ventura MD - Assisting    Procedure Summary  Anesthesia: Consult  ASA: II  Anesthesia Staff: CRNA: RUIZ Jackson-CRNA  Estimated Blood Loss: 5mL  Intra-op Medications:   Administrations occurring from 1245 to 1515 on 24:   Medication Name Total Dose   BUPivacaine-EPINEPHrine (Marcaine w/EPI) 0.5 %-1:200,000 injection 27 mL   sodium chloride 0.9 % irrigation solution 1,000 mL   sodium chloride (PF) 0.9% solution 27 mL   lactated Ringer's infusion 141.67 mL         Intraprocedure I/O Totals       None           Specimen: No specimens collected     Staff:   Circulator: Deanne Godinez RN; Antony Driscoll RN  Scrub Person: Evon Galloway         Drains and/or Catheters: * None in log *    Tourniquet Times:         Implants:  Implants       Type Name Action Serial No.      Implant LINX SYSTEM, REFLUX MNGMNT, [facility], [place] CONDITIONAL - [id number] Implanted               Findings: hiatal hernia    Indications: Raghu Pizarro is an 63 y.o. male who is having surgery for Gastroesophageal reflux disease, unspecified whether esophagitis present [K21.9].  The patient had reflux symptoms with a positive DeMeester and a hypotensive LES.  He was taken to the operating room for hiatal hernia repair and LES augmentation    The patient was seen in the " preoperative area. The risks, benefits, complications, treatment options, non-operative alternatives, expected recovery and outcomes were discussed with the patient. The possibilities of reaction to medication, pulmonary aspiration, injury to surrounding structures, bleeding, recurrent infection, the need for additional procedures, failure to diagnose a condition, and creating a complication requiring transfusion or operation were discussed with the patient. The patient concurred with the proposed plan, giving informed consent.  The site of surgery was properly noted/marked if necessary per policy. The patient has been actively warmed in preoperative area. Preoperative antibiotics have been ordered and given within 1 hours of incision. Venous thrombosis prophylaxis have been ordered including bilateral sequential compression devices    Procedure Details:   Surgeon: Sonia Roth MD, MPH  Assistant: Charu Ventura, No qualified resident was available to assist with this case.  The fellow assisted with all portions of the case including the critical portions.     Procedure: Laparoscopic hiatal hernia repair with mesh, toupet fundoplication, upper endoscopy and bilateral TAP blocks    The patient was brought to the operating room and placed in the split leg position.  General anesthesia was induced.  He was prepped and draped in usual sterile fashion.  Incision was made 15 cm from xiphoid process just left of midline and carried down to the level of the fascia the fascia incised sharply exposing the rectus underneath.  The rectus was split exposing the posterior fascia this was grasped with a hemostat and incised sharply with entering the peritoneal cavity under direct visualization.  A blunt tipped Lee trocar was placed and the abdomen was insufflated CO2 to 15 mmHg.  A 10 mm 30 degree scope was inserted and the abdomen was inspected.  No gross abnormalities were noted.  We then placed a Killian beneath the left  lobe of liver and this was held in place using Mediflex retractor affixed to the side of the bed.  Bilateral tap blocks were placed with 60 cc of 0.25% Marcaine.  We placed a 10 mm trocar in the left midclavicular line.  A 5 mm trocar in the  left anterior axillary line and another 5 mm trocar port was placed in the right upper quadrant lateral to the falciform and slightly inferior to the base of the falciform.    We began by opening the clear portion of the gastrohepatic omentum below the hepatic branch of the vagus and expose the medial border of the right chris. We incised along the medial border of the right chris and entered the hiatal hernia sac. We dissected along the medial border of the right chris reducing the sac in its entirety all the way up toward the apex of the crura. We preserved the hepatic branch of the vagus and opened up the clear portion gastropathic omentum above the hepatic branch of the vagus. Once we had cleared the right chris we turned our attention out laterally and we started midway down the fundus. We took down the short gastrics all the way around such that the base of the left chris could clearly be seen.  We then were able to come back medially and come across the base of the crura and bring a Penrose around the distal esophagus for retraction for the duration of the case.  We then incised along the medial border of the left chris all the way up toward the apex once the crural dissection was completed. We took the mediastinal attachments surrounding the esophagus as high as we could go to mobilize the esophagus and get 3 cm of intra-abdominal esophagus.  Care was taken to keep the anterior and posterior vagi against the esophagus to avoid injury.  We then inspected the mediastinum and the abdomen for any signs of bleeding and meticulous hemostasis was noted.   We had 3 cm of intra-abdominal esophagus sitting without any tension.  We then closed the hiatus.  We used 4 sutures that were 0  silk reinforced with pledgets to close the hiatus posteriorly. 1 anterior sutures were used.  This repair was then reinforced using a bio a mesh placed posteriorly and semicircumferentially..  This was secured to the right and left crura using 2-0 silk sutures.      We then brought in the sizing device for the LES augmentation device and the esophagus was sized without anything in its.  We also replaced the Penrose prior to doing so between the posterior vagus and the esophagus.  The sizer was brought between the posterior vagus and the esophagus at the base of the esophagus.  After sizing 3 times we identified that a 15 beat device would be appropriate.  The device was brought into the peritoneal cavity and then pulled into place around the esophagus and classed in place.  Once we confirmed that it was securely class and placed the sutures were cut.  The LINX device was sitting in good position.     An intraoperative endoscopy was performed. The scope was passed transorally down through the esophagus and retroflexed into the stomach to view the cardia from underneath. The patient's LES had a nice Hill 1 appearance and was a well place device.  We brought the scope back into the esophagus and viewed the LES from above and  this was a good configuration.  The scope passed easily. The stomach was deflated and the endoscope was removed. We closed the supraumbilical port using a 0 Vicryl suture placed at the level of the fascia iusing a laparoscopic suture passer.  The abdomen deflated and the trocars removed.  Skin was closed at all sites using 4-0 Vicryl suture placed in subcuticular fashion.  Exofin was placed on the wounds and the patient was awakened from anesthesia.  The patient was taken recovery in good condition   Complications:  None; patient tolerated the procedure well.    Disposition: PACU - hemodynamically stable.  Condition: stable         Additional Details: 15 bead linx was used    Attending  Attestation: I was present and scrubbed for the entire procedure.    Sonia Roth  Phone Number: [phone]

## 2024-01-29 ENCOUNTER — APPOINTMENT (OUTPATIENT)
Dept: SURGERY | Facility: CLINIC | Age: 64
End: 2024-01-29
Payer: COMMERCIAL

## 2024-01-29 PROBLEM — J30.2 SEASONAL ALLERGIES: Status: ACTIVE | Noted: 2024-01-29

## 2024-02-02 ENCOUNTER — CLINICAL SUPPORT (OUTPATIENT)
Dept: ALLERGY | Facility: CLINIC | Age: 64
End: 2024-02-02
Payer: COMMERCIAL

## 2024-02-02 DIAGNOSIS — J30.2 SEASONAL ALLERGIES: ICD-10-CM

## 2024-02-02 PROCEDURE — 95115 IMMUNOTHERAPY ONE INJECTION: CPT | Performed by: ALLERGY & IMMUNOLOGY

## 2024-02-28 ENCOUNTER — APPOINTMENT (OUTPATIENT)
Dept: SURGERY | Facility: CLINIC | Age: 64
End: 2024-02-28
Payer: COMMERCIAL

## 2024-03-04 ENCOUNTER — CLINICAL SUPPORT (OUTPATIENT)
Dept: ALLERGY | Facility: CLINIC | Age: 64
End: 2024-03-04
Payer: COMMERCIAL

## 2024-03-04 ENCOUNTER — TELEPHONE (OUTPATIENT)
Dept: ALLERGY | Facility: CLINIC | Age: 64
End: 2024-03-04

## 2024-03-04 DIAGNOSIS — J30.2 SEASONAL ALLERGIES: ICD-10-CM

## 2024-03-04 DIAGNOSIS — J32.2 CHRONIC ETHMOIDAL SINUSITIS: Primary | ICD-10-CM

## 2024-03-04 PROCEDURE — 95115 IMMUNOTHERAPY ONE INJECTION: CPT | Performed by: ALLERGY & IMMUNOLOGY

## 2024-03-04 RX ORDER — CEFDINIR 300 MG/1
300 CAPSULE ORAL 2 TIMES DAILY
Qty: 56 CAPSULE | Refills: 0 | Status: SHIPPED | OUTPATIENT
Start: 2024-03-04 | End: 2024-04-01

## 2024-03-04 NOTE — PROGRESS NOTES
GENERAL SURGERY CLINIC  POST-OP FOLLOW UP NOTE  Clinic Date: 3/13/24    Antony Pizarro, 64 y.o.  MRN: [id number]    Date of Surgery: 1/18/24  Surgical Procedure:   REPAIR DIAPHRAGMATIC HERNIA LAPAROSCOPY, LES AUGMENTATION- LINX      Extra Procedures: None  COMPLICATIONS DURING ADMISSION: None    Surgeon Attending: Dr. Sonia Roth    Last Impression Visit Note 1/24/24:   64 yo male 5 days post hiatal hernia repair with mesh and LES augmentation with linx. Overall he is doing great. Counseled on the dysphagia that this will improve. His sinuses are bothersome. Reassured his symptoms of dysphagia will improve and his reflux symtpms are better but not gone. He is having left shoulder pain and reassured this will improve with time. He has had some mild bloating. Reassured this will improve as well.     Presenting Today For:  Time Since Surgery:  6  weeks FUV.  Verbalizes concerns:  abdominal pain after eating, gas. Drinking is fine.  With eating occ sticking but usually with dryer stuff.  He is also taking larger pills and these are going down.  Now his biggest issue is gas after eating.   Before surgery he was burping a lot and now going the other way. He would like to get rid of.  At night feels gas pains at 2-3 am.  Not taking anything.   No hb.   He notes the regurg not bad but still more last couple of days.  Notes some throat clearing, less than before.  Still has some a couple of times/day.  Before was 20 times/day.     Notes some constipation.  Not drinking much water.   Self Reports:     Abdominal Pain: Yes    Constipation:  Yes   Decreased Urine Output:  No    Diarrhea:  No    Bloating / Hiccups:  Yes    Nausea/Vomiting: No    Back Pain: Yes    Calf/Thigh pain or swelling: No    Cough / Wheezing: No    Fever/Chills: No    Increased Heart Rate: No    Shortness of Breath: No    GERD - Health Related Quality of Life Questionnaire (GERD- HRQL)  On PPI    How bad is the heartburn? 0 = No symptoms  Heartburn when  lying down? 0 = No symptoms  Heartburn when standing up? 0 = No symptoms  Heartburn after meals? 0 = No symptoms  Does heartburn change your diet? 0 = No symptoms  Does heartburn wake you from sleep? 0 = No symptoms    Do you have difficulty swallowing? 3 = Symptoms bothersome every day  Do you have pain with swallowing? 3 = Symptoms bothersome every day  If you take medication, does this affect your daily life? 0 = No symptoms    How bad is the regurgitation? 3 = Symptoms bothersome every day  Regurgitation when lying down? 3 = Symptoms bothersome every day  Regurgitation when standing up? 3 = Symptoms bothersome every day  Regurgitation after meals? 3 = Symptoms bothersome every day  Does regurgitation change your diet? 3 = Symptoms bothersome every day  Does regurgitation wake you from sleep? 0 = No symptoms    Total score (calculated by summing the individual scores of questions 1-15): 21   Greatest possible score 75 (worst symptoms).   Lowest possible score 0 (no symptoms).    Heartburn score (calculated by summing the individual scores of questions 1-6): 0   Worst heartburn symptoms: 30.   No heartburn symptoms: 0.   Score less than or equal to 12 with each individual question not exceeding 2 indicate heartburn elimination.    Regurgitation score (calculated by summing the individual scores of questions 10-15): 15   Worst regurgitation symptoms: 30.   No regurgitation symptoms: 0.   Score less than or equal to 12 with each individual question not exceeding 2 indicate regurgitation elimination.     Current Diet: regular diet                 Current Medications:   Current Outpatient Medications   Medication Sig Dispense Refill    ascorbic acid, vitamin C, crystals Take by mouth.      atorvastatin (Lipitor) 10 mg tablet Take 1 tablet (10 mg) by mouth once a day on Monday, Wednesday, and Friday. 36 tablet 3    azelastine (Astelin) 137 mcg (0.1 %) nasal spray Administer 1 spray into each nostril 2 times a day. Use  in each nostril as directed 30 mL 6    cefdinir (Omnicef) 300 mg capsule Take 1 capsule (300 mg) by mouth 2 times a day for 28 days. 56 capsule 0    cholecalciferol (Vitamin D-3) 125 MCG (5000 UT) capsule Take by mouth.      fluticasone (Flonase) 50 mcg/actuation nasal spray Administer 1 spray into each nostril 2 times a day. Shake gently. Before first use, prime pump. After use, clean tip and replace cap. 16 g 6    magnesium oxide (Mag-Ox) 400 mg (241.3 mg magnesium) tablet Take by mouth.      omega-3 fatty acids (FISH OIL CONCENTRATE ORAL) Take by mouth.      omeprazole (PriLOSEC) 40 mg DR capsule Take 1 capsule (40 mg) by mouth once daily in the morning. Take before meals. Do not crush or chew. Open capsule, sprinkle beads on SF jello, pudding or applesauce. 30 capsule 3    ubidecarenone/vitamin E (COENZYME Q10-VITAMIN E ORAL) Take by mouth.      zinc sulfate (Zincate) 220 (50 Zn) MG capsule Take by mouth.       No current facility-administered medications for this visit.       Visit Vitals  Wt Readings from Last 1 Encounters:   01/24/24 89.8 kg (198 lb)    There is no height or weight on file to calculate BMI.    REVIEW OF SYSTEMS:  CONSTITUTIONAL: Patient denies fevers, chills, sweats and weight changes.  EYES: Patient denies any visual symptoms.  EARS, NOSE, AND THROAT: No difficulties with hearing. No symptoms of rhinitis or sore throat.  CARDIOVASCULAR: Patient denies chest pains, palpitations, orthopnea and paroxysmal nocturnal dyspnea.  RESPIRATORY: No dyspnea on exertion, no wheezing or cough.  GI: No nausea, vomiting, diarrhea, constipation, abdominal pain, hematochezia or melena.  : No urinary hesitancy or dribbling. No nocturia or urinary frequency. No abnormal urethral discharge.  MUSCULOSKELETAL: No myalgias or arthralgias.  NEUROLOGIC: No chronic headaches, no seizures. Patient denies numbness, tingling or weakness.  PSYCHIATRIC: Patient denies problems with mood disturbance. No problems with  anxiety.  ENDOCRINE: No excessive urination or excessive thirst.  DERMATOLOGIC: Patient denies any rashes or skin changes.    PHYSICAL EXAM:  PHYSICAL EXAMINATION:  GENERAL: No apparent distress. Pt is alert and oriented x3.  VITAL SIGNS: HR, BP, Temp; Normal  HEENT: Head is normocephalic and atraumatic. Extraocular muscles are intact. Pupils are equal, round, and reactive to light and accommodation. Nares appeared normal. Mouth is well hydrated and without lesions. Mucous membranes are moist. Posterior pharynx clear of any exudate or lesions.  NECK: Supple. No carotid bruits. No lymphadenopathy or thyromegaly.  LUNGS: Clear to auscultation.  HEART: Regular rate and rhythm without murmur.  ABDOMEN: Soft, appropriately mildly tender to palpation, and nondistended. Positive bowel sounds. No hepatosplenomegaly was noted. Incisions CDI.  EXTREMITIES: Without any cyanosis, clubbing, rash, lesions or edema.  NEUROLOGIC: Cranial nerves II through XII are grossly intact.  PSYCHIATRIC: Flat affect, but denies suicidal or homicidal ideations.  SKIN: No ulceration or induration present.      IMPRESSION: N 62 yo male 6 weeks post hiatal hernia repair with mesh and LES augmentation with linx. Overall he is doing great with reflux but struggling with gas and constipation.  He notes he used to belch a lot before surgery and that essentially has gone away.  We discussed now the area is going the other direction.  We discussed aerophagia and how to manage it.  We also discussed the use of Beano and Gas-X.  He is going to give these a try.  He also notes some constipation and the most important thing I emphasized was that he drink more water.  But prune juice can also help as well as stool softeners like Colace.  He said Dulcolax was marginally helpful.  His biggest struggle is his ongoing sinus infections right now so it is hard to breathe through his nose and so he mostly breathes through his mouth.  He is seeing several providers  to get this managed.   Seeing allergist for the congestion and also been on abx for sinus infections.  We will see him at 3 to 6 months postsurgery and see how his oropharyngeal symptoms have improved with the correction of his reflux.  He notes his throat clearing has gone from 20 times a day to just 1 or 2.  We discussed that with the length we do not expect normalization or elimination of GERD but overall control      PLAN:  Chew well and eat slowly  Drink more water-60 oz/day  You can try a stool softener-colace twice a day can help.  For the gas: try to swallow less air, take beano before   Avoid chewing gum and straws  Try to swallow less air.   Beano before meals helps with gas  GasX after meal.   A glass of prune juice daily can help with the constipation.   Try mucinex to clear the congestion        Dr. Sonia Roth M.D., MPH  Director of Bariatric & Minimally Invasive Surgery  [place] [address]  T:  [phone]  F:  [phone]     Loly White, RN Assistant Nurse Manager, Care Coordinator Patient Nursing Contact  T: [phone]  F: [phone]    Tammie Aguilar LPN Coordinator  T: [phone] F: [phone]

## 2024-03-12 DIAGNOSIS — J32.2 CHRONIC ETHMOIDAL SINUSITIS: Primary | ICD-10-CM

## 2024-03-12 PROBLEM — Z98.890 POST-OPERATIVE STATE: Status: ACTIVE | Noted: 2024-03-12

## 2024-03-13 ENCOUNTER — APPOINTMENT (OUTPATIENT)
Dept: LAB | Facility: LAB | Age: 64
End: 2024-03-13
Payer: COMMERCIAL

## 2024-03-13 ENCOUNTER — OFFICE VISIT (OUTPATIENT)
Dept: SURGERY | Facility: CLINIC | Age: 64
End: 2024-03-13
Payer: COMMERCIAL

## 2024-03-13 VITALS
DIASTOLIC BLOOD PRESSURE: 77 MMHG | HEART RATE: 80 BPM | WEIGHT: 200 LBS | BODY MASS INDEX: 27.12 KG/M2 | SYSTOLIC BLOOD PRESSURE: 130 MMHG

## 2024-03-13 DIAGNOSIS — K21.9 GASTROESOPHAGEAL REFLUX DISEASE, UNSPECIFIED WHETHER ESOPHAGITIS PRESENT: ICD-10-CM

## 2024-03-13 DIAGNOSIS — Z98.890 POST-OPERATIVE STATE: ICD-10-CM

## 2024-03-13 PROCEDURE — 1036F TOBACCO NON-USER: CPT | Performed by: SURGERY

## 2024-03-13 PROCEDURE — 99024 POSTOP FOLLOW-UP VISIT: CPT | Performed by: SURGERY

## 2024-03-13 NOTE — PATIENT INSTRUCTIONS
PLAN:  Drink more water-60 oz/day  You can try a stool softener-colace twice a day can help.  For the gas: try to swallow less air, take beano before   Avoid chewing gum and straws  Try to swallow less air.   Beano before meals helps with gas  GasX after meal.   A glass of prune juice daily can help with the constipation.   Try mucinex to clear the congestion        Dr. Sonia Roth M.D., MPH  Director of Bariatric & Minimally Invasive Surgery  [place] [address]  T:  [phone]  F:  [phone]     Loly White, RN Assistant Nurse Manager, Care Coordinator Patient Nursing Contact  T: [phone]  F: [phone]    Tammie Aguilar LPN Coordinator  T: [phone] F: [phone]

## 2024-03-19 ENCOUNTER — LAB (OUTPATIENT)
Dept: LAB | Facility: LAB | Age: 64
End: 2024-03-19
Payer: COMMERCIAL

## 2024-03-19 DIAGNOSIS — J32.2 CHRONIC ETHMOIDAL SINUSITIS: ICD-10-CM

## 2024-03-19 PROCEDURE — 87075 CULTR BACTERIA EXCEPT BLOOD: CPT

## 2024-03-19 PROCEDURE — 87184 SC STD DISK METHOD PER PLATE: CPT

## 2024-03-19 PROCEDURE — 87070 CULTURE OTHR SPECIMN AEROBIC: CPT

## 2024-03-19 PROCEDURE — 87205 SMEAR GRAM STAIN: CPT

## 2024-03-20 DIAGNOSIS — J32.2 CHRONIC ETHMOIDAL SINUSITIS: Primary | ICD-10-CM

## 2024-03-20 RX ORDER — DOXYCYCLINE 100 MG/1
100 CAPSULE ORAL 2 TIMES DAILY
Qty: 28 CAPSULE | Refills: 0 | Status: SHIPPED | OUTPATIENT
Start: 2024-03-20 | End: 2024-03-21

## 2024-03-21 DIAGNOSIS — J32.2 CHRONIC ETHMOIDAL SINUSITIS: Primary | ICD-10-CM

## 2024-03-21 LAB
BACTERIA SPEC RESP CULT: ABNORMAL
GRAM STN SPEC: ABNORMAL
GRAM STN SPEC: ABNORMAL

## 2024-04-05 ENCOUNTER — CLINICAL SUPPORT (OUTPATIENT)
Dept: ALLERGY | Facility: CLINIC | Age: 64
End: 2024-04-05
Payer: COMMERCIAL

## 2024-04-05 DIAGNOSIS — J30.2 SEASONAL ALLERGIES: ICD-10-CM

## 2024-04-05 PROCEDURE — 95115 IMMUNOTHERAPY ONE INJECTION: CPT | Performed by: ALLERGY & IMMUNOLOGY

## 2024-04-19 PROBLEM — M10.9: Status: ACTIVE | Noted: 2024-04-19

## 2024-04-19 PROBLEM — R09.81 NASAL CONGESTION: Status: ACTIVE | Noted: 2024-04-19

## 2024-04-19 PROBLEM — H92.09 PAIN OF EAR STRUCTURE: Status: ACTIVE | Noted: 2024-04-19

## 2024-04-19 PROBLEM — K43.2 VENTRAL INCISIONAL HERNIA: Status: ACTIVE | Noted: 2024-04-19

## 2024-04-19 PROBLEM — M25.579 ACUTE ANKLE PAIN: Status: ACTIVE | Noted: 2024-04-19

## 2024-04-19 PROBLEM — J01.90 ACUTE SINUSITIS: Status: ACTIVE | Noted: 2024-04-19

## 2024-04-19 PROBLEM — R06.02 SHORTNESS OF BREATH: Status: ACTIVE | Noted: 2024-04-19

## 2024-04-19 PROBLEM — J20.9 ACUTE BRONCHITIS: Status: ACTIVE | Noted: 2024-04-19

## 2024-04-19 PROBLEM — R10.32 LEFT LOWER QUADRANT ABDOMINAL PAIN: Status: ACTIVE | Noted: 2023-08-21

## 2024-05-01 ENCOUNTER — OFFICE VISIT (OUTPATIENT)
Dept: OTOLARYNGOLOGY | Facility: CLINIC | Age: 64
End: 2024-05-01
Payer: COMMERCIAL

## 2024-05-01 VITALS
HEIGHT: 72 IN | RESPIRATION RATE: 16 BRPM | HEART RATE: 68 BPM | TEMPERATURE: 98.3 F | WEIGHT: 207 LBS | SYSTOLIC BLOOD PRESSURE: 115 MMHG | BODY MASS INDEX: 28.04 KG/M2 | OXYGEN SATURATION: 99 % | DIASTOLIC BLOOD PRESSURE: 66 MMHG

## 2024-05-01 DIAGNOSIS — J32.2 CHRONIC ETHMOIDAL SINUSITIS: Primary | ICD-10-CM

## 2024-05-01 DIAGNOSIS — J34.89 RHINORRHEA: ICD-10-CM

## 2024-05-01 PROCEDURE — 99213 OFFICE O/P EST LOW 20 MIN: CPT | Performed by: STUDENT IN AN ORGANIZED HEALTH CARE EDUCATION/TRAINING PROGRAM

## 2024-05-01 PROCEDURE — 31231 NASAL ENDOSCOPY DX: CPT | Performed by: STUDENT IN AN ORGANIZED HEALTH CARE EDUCATION/TRAINING PROGRAM

## 2024-05-01 RX ORDER — DEXTROMETHORPHAN/PSEUDOEPHED 2.5-7.5/.8
40 DROPS ORAL 4 TIMES DAILY PRN
COMMUNITY
Start: 2024-01-18

## 2024-05-01 RX ORDER — GABAPENTIN 300 MG/1
300 CAPSULE ORAL EVERY 8 HOURS
COMMUNITY
Start: 2024-01-18

## 2024-05-01 SDOH — ECONOMIC STABILITY: FOOD INSECURITY: WITHIN THE PAST 12 MONTHS, YOU WORRIED THAT YOUR FOOD WOULD RUN OUT BEFORE YOU GOT MONEY TO BUY MORE.: NEVER TRUE

## 2024-05-01 SDOH — ECONOMIC STABILITY: FOOD INSECURITY: WITHIN THE PAST 12 MONTHS, THE FOOD YOU BOUGHT JUST DIDN'T LAST AND YOU DIDN'T HAVE MONEY TO GET MORE.: NEVER TRUE

## 2024-05-01 ASSESSMENT — PATIENT HEALTH QUESTIONNAIRE - PHQ9
SUM OF ALL RESPONSES TO PHQ9 QUESTIONS 1 AND 2: 0
2. FEELING DOWN, DEPRESSED OR HOPELESS: NOT AT ALL
1. LITTLE INTEREST OR PLEASURE IN DOING THINGS: NOT AT ALL

## 2024-05-01 ASSESSMENT — PAIN SCALES - GENERAL: PAINLEVEL: 0-NO PAIN

## 2024-05-01 ASSESSMENT — COLUMBIA-SUICIDE SEVERITY RATING SCALE - C-SSRS
1. IN THE PAST MONTH, HAVE YOU WISHED YOU WERE DEAD OR WISHED YOU COULD GO TO SLEEP AND NOT WAKE UP?: NO
6. HAVE YOU EVER DONE ANYTHING, STARTED TO DO ANYTHING, OR PREPARED TO DO ANYTHING TO END YOUR LIFE?: NO
2. HAVE YOU ACTUALLY HAD ANY THOUGHTS OF KILLING YOURSELF?: NO

## 2024-05-01 ASSESSMENT — ENCOUNTER SYMPTOMS
OCCASIONAL FEELINGS OF UNSTEADINESS: 0
DEPRESSION: 0
LOSS OF SENSATION IN FEET: 0

## 2024-05-01 NOTE — PROGRESS NOTES
"SUBJECTIVE  Patient ID: Antony Pizarro \"Raghu\" is a 64 y.o. male who presents for Follow-up.    History   He reports longstanding sinus issues. Last had surgery 2020 with my colleague, Dr. Moore.  Reports that after surgery he continued to have symptoms which she treated with multiple different medications.  Currently working with allergy and has noted some \"marginal\" improvement with treatment of dust mite IT.  Previously had workup for possible immune deficiency which was normal.    Caught an infection July 2023. Was then treated with a string of antibiotics (now been on 5 courses of antibiotics - cephalexin, Levaquin, amoxicillin-clav; no steroids - didn't take because of active levofloxacin prescription). Not currently on any nasal sprays was previously on generic Flonase. Notes some improvement in nasal discharge while on antibiotics; then thicker discharge comes up. Had sun sensitivity with doxycycline. Irrigates frequently.     They endorse nasal congestion, nasal obstruction, nasal drainage, and facial pressure. Nasal obstruction is worse at night. Reports very poor sense of smell. They deny epistaxis. Had some dry blood while on Keflex recently. History of trauma playing football with broken nose; had reduction and septoplasty. Previously tried saline spray, saline irrigations, steroid spray (Flonase, Ryaltris), antihistamine spray, oral antihistamine (Claritin), and montelukast.     Has a history of significant GERD; gets a \"lot of regurgitation.\" Trying a new LINK procedure soon (had to cancel planned procedure secondary to the influenza).    Update 5/1/2024:  Still having drainage but thicker green mucous has not recurred for the last two weeks. Completed meropenem irrigations about a week ago.    OBJECTIVE  Physical Exam  CONSTITUTIONAL: Well appearing male who appears stated age.  PSYCHIATRIC: Alert, appropriate mood and affect.  RESPIRATORY: Normal inspiration and expiration and chest wall " expansion; no use of accessory muscles to breathe.  VOICE: Clear speech without hoarseness. No stridor nor stertor.  HEAD, FACE, AND SKIN: Symmetric facial feature.  NOSE: Nasal dorsum was midline. Anterior rhinoscopy demonstrated a septum relatively midline. Inferior turbinates were mildly hypertrophied. No obvious nasal masses, polyps, mucopurulence, nor other lesions were appreciated.  OROPHARYNX: No lesion nor mucosal abnormality. The uvula was normal appearing. The tonsils were surgically absent.  No drainage.    Radiology  CT sinus 2019: I personally reviewed the patient's prior imaging.  This demonstrates evidence of limited sinus surgery involving the right maxillary sinus and anterior ethmoid air cells.  There was varying levels of mucosal thickening involving the paranasal sinuses.    --------------------------------------------------  Procedure: Nasal endoscopy - Diagnostic  Indication: Chronic rhinitis, concern for chronic sinusitis  Informed consent verbally obtained: The risks, benefits, alternatives, and expectations were discussed with the patient, who wished to proceed  Anesthesia: Topical lidocaine/oxymetazoline    Findings: After topical anesthetic was applied the nasal cavities were examined with a flexible endoscope. The septum was relatively midline.  - Right: The inferior turbinate was moderately hypertrophied.  There is a small amount of clear drainage within the nasal cavity, less than on prior exam.  The middle turbinate appeared healthy.  There is evidence of prior widely patent maxillary antrostomy without accumulation of mucus is noted on prior scan.  The ethmoid sinuses are clear to the skull base with widely patent frontal sinus outflow tract.  The sphenoid sinus is widely patent.  - Left: The inferior turbinate was moderately hypertrophied.  Some clear drainage today, less than on higher exam.  The middle turbinate appeared healthy.  There is evidence of prior surgery with widely  patent maxillary antrostomy, ethmoid sinuses, sphenoid os, and frontal sinus outflow tract.    There were no complications and the patient tolerated the procedure well.  --------------------------------------------------    ASSESSMENT/PLAN  Diagnoses and all orders for this visit:  Chronic ethmoidal sinusitis  Rhinorrhea      64 y.o. male with longstanding nasal and sinus concerns partially secondary to allergic rhinitis.    1.  Chronic/allergic rhinitis, history of chronic sinusitis, nasal drainage  The patient reports longstanding difficulty with sinus disease.  He has a history of dust mite allergy and is currently getting immunotherapy.  However, over the last half year he has had increasing thick nasal drainage that has not responded well to multiple rounds of antibiotics.    Endoscopy: Evidence of prior surgery fully healed.  Questionable scarring within the right maxillary sinus.  Thicker drainage within the nasal cavity but no evidence of sinus infection.  Imaging: clear paranasal sinuses with some pooling in right max    Since last visit the patient has had multiple visits with other providers who have been concerned about persistent thicker drainage.  Imaging seem to demonstrate some pooling of secretions in the right maxillary sinus.  He is now status post a month of meropenem irrigations and he has noted improvement in his drainage.  While he still has some clear drainage his thicker mucus has resolved.  On repeat endoscopy I do not appreciate accumulation of mucus within the right maxillary sinus.    Given his improvement in symptoms and clear endoscopy today, I recommended he restart his typical saline irrigations with nasal steroid spray.  He will follow-up in 2 to 3 months to check on his progress.  If he has recurrence of symptoms or chronic sinusitis involving the right maxillary sinus we may need to consider revision surgery with Evert antrostomy.    This note was created using speech recognition  transcription software. Despite proofreading, typographical errors may be present that affect the meaning of the content. Please contact my office with any questions.

## 2024-05-06 ENCOUNTER — CLINICAL SUPPORT (OUTPATIENT)
Dept: ALLERGY | Facility: CLINIC | Age: 64
End: 2024-05-06
Payer: COMMERCIAL

## 2024-05-06 DIAGNOSIS — J30.2 SEASONAL ALLERGIES: ICD-10-CM

## 2024-05-06 PROCEDURE — 95115 IMMUNOTHERAPY ONE INJECTION: CPT | Performed by: ALLERGY & IMMUNOLOGY

## 2024-05-08 ENCOUNTER — OFFICE VISIT (OUTPATIENT)
Dept: CARDIOLOGY | Facility: CLINIC | Age: 64
End: 2024-05-08
Payer: COMMERCIAL

## 2024-05-08 VITALS
DIASTOLIC BLOOD PRESSURE: 74 MMHG | BODY MASS INDEX: 28.04 KG/M2 | HEART RATE: 74 BPM | SYSTOLIC BLOOD PRESSURE: 114 MMHG | HEIGHT: 72 IN | WEIGHT: 207 LBS | OXYGEN SATURATION: 97 %

## 2024-05-08 DIAGNOSIS — I15.9 SECONDARY HYPERTENSION: Primary | ICD-10-CM

## 2024-05-08 DIAGNOSIS — I25.10 CORONARY ARTERY DISEASE INVOLVING NATIVE HEART WITHOUT ANGINA PECTORIS, UNSPECIFIED VESSEL OR LESION TYPE: ICD-10-CM

## 2024-05-08 DIAGNOSIS — E78.5 HYPERLIPIDEMIA, UNSPECIFIED HYPERLIPIDEMIA TYPE: ICD-10-CM

## 2024-05-08 DIAGNOSIS — R06.09 EXERTIONAL DYSPNEA: ICD-10-CM

## 2024-05-08 PROCEDURE — 99214 OFFICE O/P EST MOD 30 MIN: CPT | Performed by: INTERNAL MEDICINE

## 2024-05-08 PROCEDURE — 93000 ELECTROCARDIOGRAM COMPLETE: CPT | Performed by: INTERNAL MEDICINE

## 2024-05-08 PROCEDURE — 3078F DIAST BP <80 MM HG: CPT | Performed by: INTERNAL MEDICINE

## 2024-05-08 PROCEDURE — 3074F SYST BP LT 130 MM HG: CPT | Performed by: INTERNAL MEDICINE

## 2024-05-08 PROCEDURE — 1036F TOBACCO NON-USER: CPT | Performed by: INTERNAL MEDICINE

## 2024-05-08 RX ORDER — ATORVASTATIN CALCIUM 20 MG/1
20 TABLET, FILM COATED ORAL DAILY
Qty: 90 TABLET | Refills: 3 | Status: SHIPPED | OUTPATIENT
Start: 2024-05-08 | End: 2025-05-08

## 2024-05-08 ASSESSMENT — ENCOUNTER SYMPTOMS: DYSPNEA ON EXERTION: 1

## 2024-05-08 NOTE — PATIENT INSTRUCTIONS
We are going to schedule you for a stress test.    Take atorvastatin 20 mg daily    Blood tests in 2-3 months.

## 2024-05-08 NOTE — PROGRESS NOTES
"Viky Pizarro \"Raghu\" is a 64 y.o. male.    Chief Complaint:  Follow-up coronary artery disease.    HPI    He has complaints of fatigue and dyspnea with exertion.  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  Has been treated for Calderon's esophagus and has undergone a procedure with placement of a Linx device.  Also has had episodes of prolonged problems with sinus issues.  On her multiple antibiotics.  Followed by the ENT service.    Cardiac catheterization in 2005 demonstrated 40% left anterior descending, normal circumflex and normal right coronary artery with normal left ventricular function.    A review of the CT scan of the abdomen demonstrates extensive atherosclerosis in the margin of the mid left anterior descending coronary artery.  There is minimal atherosclerosis in the circumflex and right coronary artery.  This study was done in August 2023.     The patient was hospitalized after he was noted to have a platelet count of 5,000. He was treated with steroids. His followup platelet count was normal.     His most recent lipid profile shows a cholesterol level of 100, LDL 26, HDL 61.     A stress thallium study performed on February 12, 2014 demonstrated excellent functional aerobic capacity. There are no ST segment shifts and the thallium study is normal.     Allergies  Medication    · No Known Drug Allergies   Recorded By: Shira Lord; 6/16/2016 1:27:10 PM  NonMedication    · Dairy   Recorded By: Lenora Kerr; 5/31/2017 8:59:53 AM   · Dust   Recorded By: Lenora Kerr; 5/31/2017 8:59:53 AM   · Dust Mite   Recorded By: Patricia Gentile; 7/13/2021 4:30:32 PM   · Eggs   Recorded By: Joana Chase; 10/25/2018 1:25:56 PM   · Mold   Recorded By: Lenora Kerr; 5/31/2017 8:59:53 AM   · Tomatoes   Recorded By: Lenora Kerr; 5/31/2017 8:59:53 AM     Family History  Father    · Family history of Coronary artery disease   · " Family history of cardiac disorder (V17.49) (Z82.49)   · Family history of congestive heart failure (V17.49) (Z82.49)     Social History  Problems    · Alcohol use (V49.89) (Z78.9)   · Denied: History of Drug use   · Employed   · Lives with friend   · Never a smoker   · Never smoker   · No caffeine use   · Non-smoker (V49.89) (Z78.9)   ·  (V61.07) (Z63.4)    Review of Systems   Constitutional: Positive for malaise/fatigue.   Cardiovascular:  Positive for dyspnea on exertion.   All other systems reviewed and are negative.      Visit Vitals  /74 (BP Location: Right arm)   Pulse 74   Ht 1.829 m (6')   Wt 93.9 kg (207 lb)   SpO2 97%   BMI 28.07 kg/m²   Smoking Status Never   BSA 2.18 m²        Objective     Constitutional:       Appearance: Not in distress.   Neck:      Vascular: JVD normal.   Pulmonary:      Breath sounds: Normal breath sounds.   Cardiovascular:      Normal rate. Regular rhythm. S1 with normal intensity. S2 with normal intensity.       Murmurs: There is no murmur.      No gallop.    Pulses:     Intact distal pulses.   Edema:     Peripheral edema absent.   Abdominal:      General: Bowel sounds are normal.   Neurological:      Mental Status: Alert and oriented to person, place and time.         Lab Review:   Lab Results   Component Value Date     01/19/2024    K 4.1 01/19/2024     01/19/2024    CO2 30 01/19/2024    BUN 10 01/19/2024    CREATININE 1.02 01/19/2024    GLUCOSE 98 01/19/2024    CALCIUM 8.8 01/19/2024     Lab Results   Component Value Date    CHOL 153 07/19/2023    TRIG 88 07/19/2023    HDL 55.8 07/19/2023       Assessment:    1.  Coronary artery disease.  Extensive proximal to mid left anterior descending coronary artery stenosis.  Concerned about exercise intensity.  We are going to do a treadmill exercise stress test.  She may know that he has focal atherosclerosis in the proximal left anterior descending coronary artery the circumflex and right coronary arteries  appear normal.    2.  Hypertension.  Blood pressures are normal.    3.  Hyperlipidemia.  Most recent LDL is 80.  Will have him increase his atorvastatin to 20 mg.

## 2024-06-03 ENCOUNTER — CLINICAL SUPPORT (OUTPATIENT)
Dept: ALLERGY | Facility: CLINIC | Age: 64
End: 2024-06-03
Payer: COMMERCIAL

## 2024-06-03 DIAGNOSIS — J30.2 SEASONAL ALLERGIES: ICD-10-CM

## 2024-06-03 PROCEDURE — 95115 IMMUNOTHERAPY ONE INJECTION: CPT | Performed by: ALLERGY & IMMUNOLOGY

## 2024-06-14 ENCOUNTER — HOSPITAL ENCOUNTER (OUTPATIENT)
Dept: CARDIOLOGY | Facility: CLINIC | Age: 64
Discharge: HOME | End: 2024-06-14
Payer: COMMERCIAL

## 2024-06-14 ENCOUNTER — APPOINTMENT (OUTPATIENT)
Dept: CARDIOLOGY | Facility: CLINIC | Age: 64
End: 2024-06-14
Payer: COMMERCIAL

## 2024-06-14 VITALS
DIASTOLIC BLOOD PRESSURE: 76 MMHG | WEIGHT: 202 LBS | HEART RATE: 90 BPM | BODY MASS INDEX: 27.4 KG/M2 | SYSTOLIC BLOOD PRESSURE: 118 MMHG | OXYGEN SATURATION: 96 %

## 2024-06-14 DIAGNOSIS — R06.09 EXERTIONAL DYSPNEA: ICD-10-CM

## 2024-06-14 DIAGNOSIS — I25.10 CORONARY ARTERY DISEASE INVOLVING NATIVE HEART WITHOUT ANGINA PECTORIS, UNSPECIFIED VESSEL OR LESION TYPE: Primary | ICD-10-CM

## 2024-06-14 DIAGNOSIS — E78.5 HYPERLIPIDEMIA, UNSPECIFIED HYPERLIPIDEMIA TYPE: ICD-10-CM

## 2024-06-14 DIAGNOSIS — R07.89 ATYPICAL CHEST PAIN: ICD-10-CM

## 2024-06-14 DIAGNOSIS — I25.10 CORONARY ARTERY DISEASE INVOLVING NATIVE HEART WITHOUT ANGINA PECTORIS, UNSPECIFIED VESSEL OR LESION TYPE: ICD-10-CM

## 2024-06-14 DIAGNOSIS — R94.31 ABNORMAL EKG: ICD-10-CM

## 2024-06-14 PROBLEM — R00.0 SINUS TACHYCARDIA: Status: RESOLVED | Noted: 2023-10-07 | Resolved: 2024-06-14

## 2024-06-14 PROCEDURE — 99212 OFFICE O/P EST SF 10 MIN: CPT | Performed by: INTERNAL MEDICINE

## 2024-06-14 PROCEDURE — 93017 CV STRESS TEST TRACING ONLY: CPT

## 2024-06-14 NOTE — PROGRESS NOTES
"Viky Pizarro \"Raghu\" is a 64 y.o. male.    Chief Complaint:  Exertional dyspnea.    HPI    On his last visit he presented to us with exertional dyspnea.  We elected to proceed with a stress test to determine if there is evidence of ischemic heart disease.  He is here for follow-up of the results.    Cardiac catheterization in 2005 demonstrated 40% left anterior descending, normal circumflex and normal right coronary artery with normal left ventricular function.     A review of the CT scan of the abdomen demonstrates extensive atherosclerosis in the margin of the mid left anterior descending coronary artery.  There is minimal atherosclerosis in the circumflex and right coronary artery.  This study was done in August 2023.     The patient was hospitalized after he was noted to have a platelet count of 5,000. He was treated with steroids. His followup platelet count was normal.     His most recent lipid profile shows a cholesterol level of 100, LDL 26, HDL 61.     A stress thallium study performed on February 12, 2014 demonstrated excellent functional aerobic capacity. There are no ST segment shifts and the thallium study is normal.     Allergies  Medication    · No Known Drug Allergies   Recorded By: Shira Lord; 6/16/2016 1:27:10 PM  NonMedication    · Dairy   Recorded By: Lenora Kerr; 5/31/2017 8:59:53 AM   · Dust   Recorded By: Lenora Kerr; 5/31/2017 8:59:53 AM   · Dust Mite   Recorded By: Patricia Gentile; 7/13/2021 4:30:32 PM   · Eggs   Recorded By: Joana Chase; 10/25/2018 1:25:56 PM   · Mold   Recorded By: Lenora Kerr; 5/31/2017 8:59:53 AM   · Tomatoes   Recorded By: Lenora Kerr; 5/31/2017 8:59:53 AM     Family History  Father    · Family history of Coronary artery disease   · Family history of cardiac disorder (V17.49) (Z82.49)   · Family history of congestive heart failure (V17.49) (Z82.49)     Social History  Problems    · Alcohol use (V49.89) " (Z78.9)   · Non-smoker (V49.89) (Z78.9)   ·  (V61.07) (Z63.4)     Review of Systems   Constitutional: Positive for malaise/fatigue.   Cardiovascular:  Positive for dyspnea on exertion.   All other systems reviewed and are negative.      Current Outpatient Medications   Medication Sig Dispense Refill    ascorbic acid, vitamin C, crystals Take by mouth.      atorvastatin (Lipitor) 20 mg tablet Take 1 tablet (20 mg) by mouth once daily. 90 tablet 3    cholecalciferol (Vitamin D-3) 125 MCG (5000 UT) capsule Take by mouth.      fexofenadine ODT (Allegra ODT) 30 mg disintegrating tablet Take 1 tablet (30 mg) by mouth once daily.      fluticasone (Flonase) 50 mcg/actuation nasal spray Administer 1 spray into each nostril 2 times a day. Shake gently. Before first use, prime pump. After use, clean tip and replace cap. 16 g 6    magnesium oxide (Mag-Ox) 400 mg (241.3 mg magnesium) tablet Take by mouth.      omega-3 fatty acids (FISH OIL CONCENTRATE ORAL) Take by mouth.      ubidecarenone/vitamin E (COENZYME Q10-VITAMIN E ORAL) Take by mouth.      zinc sulfate (Zincate) 220 (50 Zn) MG capsule Take by mouth.      simethicone (Mylicon) 40 mg/0.6 mL drops Take 0.6 mL (40 mg) by mouth 4 times a day as needed.       No current facility-administered medications for this visit.        Visit Vitals  /76 (BP Location: Left arm, Patient Position: Sitting)   Pulse 90   Wt 91.6 kg (202 lb)   SpO2 96%   BMI 27.40 kg/m²   Smoking Status Never   BSA 2.16 m²        Objective     Physical Exam    Lab Review:   Lab Results   Component Value Date     01/19/2024    K 4.1 01/19/2024     01/19/2024    CO2 30 01/19/2024    BUN 10 01/19/2024    CREATININE 1.02 01/19/2024    GLUCOSE 98 01/19/2024    CALCIUM 8.8 01/19/2024       Assessment:    1.  Coronary disease.  Today's stress test shows that he has excellent exercise ability.  No significant EKG changes.  I plan is to continue medical management for coronary disease.    2.   Hypertension.  Normal blood pressure response to exercise.    3.  Hyperlipidemia.  We have adjusted his statin therapy.

## 2024-07-01 ENCOUNTER — APPOINTMENT (OUTPATIENT)
Dept: ALLERGY | Facility: CLINIC | Age: 64
End: 2024-07-01
Payer: COMMERCIAL

## 2024-07-01 DIAGNOSIS — J30.2 SEASONAL ALLERGIES: ICD-10-CM

## 2024-07-01 PROCEDURE — 95115 IMMUNOTHERAPY ONE INJECTION: CPT | Performed by: ALLERGY & IMMUNOLOGY

## 2024-07-24 ENCOUNTER — APPOINTMENT (OUTPATIENT)
Dept: DERMATOLOGY | Facility: CLINIC | Age: 64
End: 2024-07-24
Payer: COMMERCIAL

## 2024-07-24 DIAGNOSIS — L71.0 PERIORAL DERMATITIS: Primary | ICD-10-CM

## 2024-07-24 DIAGNOSIS — L82.1 SEBORRHEIC KERATOSIS: ICD-10-CM

## 2024-07-24 DIAGNOSIS — L91.8 SKIN TAG: ICD-10-CM

## 2024-07-24 PROCEDURE — 1036F TOBACCO NON-USER: CPT | Performed by: DERMATOLOGY

## 2024-07-24 PROCEDURE — 99214 OFFICE O/P EST MOD 30 MIN: CPT | Performed by: DERMATOLOGY

## 2024-07-24 RX ORDER — METRONIDAZOLE 7.5 MG/G
CREAM TOPICAL
Qty: 45 G | Refills: 3 | Status: SHIPPED | OUTPATIENT
Start: 2024-07-24

## 2024-07-24 ASSESSMENT — DERMATOLOGY QUALITY OF LIFE (QOL) ASSESSMENT
RATE HOW BOTHERED YOU ARE BY EFFECTS OF YOUR SKIN PROBLEMS ON YOUR ACTIVITIES (EG, GOING OUT, ACCOMPLISHING WHAT YOU WANT, WORK ACTIVITIES OR YOUR RELATIONSHIPS WITH OTHERS): 1
DATE THE QUALITY-OF-LIFE ASSESSMENT WAS COMPLETED: 67045
RATE HOW EMOTIONALLY BOTHERED YOU ARE BY YOUR SKIN PROBLEM (FOR EXAMPLE, WORRY, EMBARRASSMENT, FRUSTRATION): 6 - ALWAYS BOTHERED
RATE HOW BOTHERED YOU ARE BY SYMPTOMS OF YOUR SKIN PROBLEM (EG, ITCHING, STINGING BURNING, HURTING OR SKIN IRRITATION): 6 - ALWAYS BOTHERED
WHAT SINGLE SKIN CONDITION LISTED BELOW IS THE PATIENT ANSWERING THE QUALITY-OF-LIFE ASSESSMENT QUESTIONS ABOUT: DERMATITIS
ARE THERE EXCLUSIONS OR EXCEPTIONS FOR THE QUALITY OF LIFE ASSESSMENT: NO

## 2024-07-24 ASSESSMENT — DERMATOLOGY PATIENT ASSESSMENT
DO YOU USE SUNSCREEN: OCCASIONALLY
DO YOU USE A TANNING BED: NO
ARE YOU AN ORGAN TRANSPLANT RECIPIENT: NO
HAVE YOU HAD OR DO YOU HAVE VASCULAR DISEASE: YES
HAVE YOU HAD OR DO YOU HAVE A STAPH INFECTION: NO
FOR PATIENTS COMING IN FOR A FOLLOW-UP VISIT - HAVE THERE BEEN ANY CHANGES IN YOUR HEALTH SINCE YOUR LAST VISIT: NO
DO YOU HAVE ANY NEW OR CHANGING LESIONS: NO

## 2024-07-24 ASSESSMENT — ITCH NUMERIC RATING SCALE: HOW SEVERE IS YOUR ITCHING?: 0

## 2024-07-24 ASSESSMENT — PATIENT GLOBAL ASSESSMENT (PGA): PATIENT GLOBAL ASSESSMENT: PATIENT GLOBAL ASSESSMENT:  3 - MODERATE

## 2024-07-24 NOTE — PROGRESS NOTES
Subjective     Raghu Pizarro is a 64 y.o. male who presents for the following: Dermatitis (Pt here to follow up for Perioral Dermatitis. Pt has tried Metronidazole Cream. No current treatment. Pt reports redness and bumps. ). Metronidazole was helpful. Not using currently.  He does use Flonase for seasonal allergies    Review of Systems:  No other skin or systemic complaints other than what is documented elsewhere in the note.    The following portions of the chart were reviewed this encounter and updated as appropriate:          Skin Cancer History  No skin cancer on file.      Specialty Problems          Dermatology Problems    Melanocytic nevi of other parts of face    Melanocytic nevi of scalp and neck    Melanocytic nevi of trunk    Melanocytic nevi of unspecified lower limb, including hip    Melanocytic nevi of unspecified upper limb, including shoulder    Neoplasm of uncertain behavior of skin    Other seborrheic keratosis    Perioral dermatitis    Rosacea, unspecified    Skin changes due to chronic exposure to nonionizing radiation, unspecified        Objective   Well appearing patient in no apparent distress; mood and affect are within normal limits.    A focused skin examination was performed of the face, neck, left shoulder, posterior legs. All findings within normal limits unless otherwise noted below.    Assessment/Plan   1. Perioral dermatitis  Head - Anterior (Face)  Perinasal and perioral erythematous papules and pustules    Currently flaring    The chronic and intermittently flaring nature of the skin condition was discussed with the patient today. Discussed common triggers associated with perioral dermatitis including fluoride treatment, cinnamon gum and/or mints, inhaled or oral corticosteroids. Various treatment options discussed and risks and benefits of each.   Patient to begin metronidazole 0.75% cream 2x daily to affected areas.    If not improving consider sulfur wash, pt allergic to  doxycycline.    2. Skin tag (4)  Left Anterior Neck, Left Supraclavicular Area (3)  Fleshy, skin-colored sessile and pedunculated papules.     Benign growths that most commonly occur in areas of friction and rubbing, specifically the neck, axilla, and inguinal creases. No treatment is necessary. If lesions are symptomatic, they can be removed, however regardless of symptoms some insurances may not cover this procedure.    Removed 4 lesions with electrodessication as courtesy today.    Destr of lesion - Left Anterior Neck, Left Supraclavicular Area (3)  Complexity: simple    Destruction method comment:  Electrodessication  Informed consent: discussed and consent obtained    Procedure prep:  Patient prepped in sterile fashion  Outcome: patient tolerated procedure well with no complications    Post-procedure details: wound care instructions given      3. Seborrheic keratosis (3)  Left Lower Leg - Posterior, Left Shoulder - Anterior, Right Lower Leg - Posterior  Brown, tan waxy macules and stuck on appearing papules and plaques    The benign nature of these skin lesions reviewed, reassure provided and no further treatment needed at this time.   These lesions can be removed, if symptomatic (itching, bleeding, rubbing on clothing, painful), otherwise removal is considered cosmetic.

## 2024-07-29 ENCOUNTER — APPOINTMENT (OUTPATIENT)
Dept: ALLERGY | Facility: CLINIC | Age: 64
End: 2024-07-29
Payer: COMMERCIAL

## 2024-07-29 DIAGNOSIS — J30.2 SEASONAL ALLERGIES: ICD-10-CM

## 2024-07-29 PROCEDURE — 95117 IMMUNOTHERAPY INJECTIONS: CPT | Performed by: ALLERGY & IMMUNOLOGY

## 2024-07-31 ENCOUNTER — APPOINTMENT (OUTPATIENT)
Dept: OTOLARYNGOLOGY | Facility: CLINIC | Age: 64
End: 2024-07-31
Payer: COMMERCIAL

## 2024-08-19 DIAGNOSIS — E78.5 HYPERLIPIDEMIA, UNSPECIFIED HYPERLIPIDEMIA TYPE: ICD-10-CM

## 2024-08-20 RX ORDER — ATORVASTATIN CALCIUM 20 MG/1
20 TABLET, FILM COATED ORAL DAILY
Qty: 90 TABLET | Refills: 3 | Status: SHIPPED | OUTPATIENT
Start: 2024-08-20 | End: 2025-08-20

## 2024-08-27 ENCOUNTER — APPOINTMENT (OUTPATIENT)
Dept: ALLERGY | Facility: CLINIC | Age: 64
End: 2024-08-27
Payer: COMMERCIAL

## 2024-08-27 DIAGNOSIS — J30.2 SEASONAL ALLERGIES: ICD-10-CM

## 2024-08-27 PROCEDURE — 95115 IMMUNOTHERAPY ONE INJECTION: CPT | Performed by: ALLERGY & IMMUNOLOGY

## 2024-09-18 ENCOUNTER — OFFICE VISIT (OUTPATIENT)
Dept: OTOLARYNGOLOGY | Facility: CLINIC | Age: 64
End: 2024-09-18
Payer: COMMERCIAL

## 2024-09-18 ENCOUNTER — LAB (OUTPATIENT)
Dept: LAB | Facility: LAB | Age: 64
End: 2024-09-18
Payer: COMMERCIAL

## 2024-09-18 VITALS
TEMPERATURE: 97.2 F | DIASTOLIC BLOOD PRESSURE: 73 MMHG | BODY MASS INDEX: 28.65 KG/M2 | WEIGHT: 211.5 LBS | HEIGHT: 72 IN | OXYGEN SATURATION: 99 % | RESPIRATION RATE: 16 BRPM | HEART RATE: 78 BPM | SYSTOLIC BLOOD PRESSURE: 123 MMHG

## 2024-09-18 DIAGNOSIS — R06.09 EXERTIONAL DYSPNEA: ICD-10-CM

## 2024-09-18 DIAGNOSIS — J32.4 CHRONIC PANSINUSITIS: Primary | ICD-10-CM

## 2024-09-18 DIAGNOSIS — J34.89 NASAL DRAINAGE: ICD-10-CM

## 2024-09-18 DIAGNOSIS — E78.5 HYPERLIPIDEMIA, UNSPECIFIED HYPERLIPIDEMIA TYPE: ICD-10-CM

## 2024-09-18 DIAGNOSIS — J30.89 ALLERGIC RHINITIS DUE TO OTHER ALLERGIC TRIGGER, UNSPECIFIED SEASONALITY: ICD-10-CM

## 2024-09-18 DIAGNOSIS — I25.10 CORONARY ARTERY DISEASE INVOLVING NATIVE HEART WITHOUT ANGINA PECTORIS, UNSPECIFIED VESSEL OR LESION TYPE: ICD-10-CM

## 2024-09-18 LAB
ALBUMIN SERPL BCP-MCNC: 4.2 G/DL (ref 3.4–5)
ALP SERPL-CCNC: 68 U/L (ref 33–136)
ALT SERPL W P-5'-P-CCNC: 23 U/L (ref 10–52)
ANION GAP SERPL CALC-SCNC: 9 MMOL/L (ref 10–20)
AST SERPL W P-5'-P-CCNC: 22 U/L (ref 9–39)
BILIRUB SERPL-MCNC: 1.4 MG/DL (ref 0–1.2)
BUN SERPL-MCNC: 17 MG/DL (ref 6–23)
CALCIUM SERPL-MCNC: 9.3 MG/DL (ref 8.6–10.6)
CHLORIDE SERPL-SCNC: 105 MMOL/L (ref 98–107)
CHOLEST SERPL-MCNC: 122 MG/DL (ref 0–199)
CHOLESTEROL/HDL RATIO: 2.1
CO2 SERPL-SCNC: 32 MMOL/L (ref 21–32)
CREAT SERPL-MCNC: 1.08 MG/DL (ref 0.5–1.3)
EGFRCR SERPLBLD CKD-EPI 2021: 77 ML/MIN/1.73M*2
GLUCOSE SERPL-MCNC: 96 MG/DL (ref 74–99)
HDLC SERPL-MCNC: 57.6 MG/DL
LDLC SERPL CALC-MCNC: 50 MG/DL
NON HDL CHOLESTEROL: 64 MG/DL (ref 0–149)
POTASSIUM SERPL-SCNC: 3.9 MMOL/L (ref 3.5–5.3)
PROT SERPL-MCNC: 6.5 G/DL (ref 6.4–8.2)
SODIUM SERPL-SCNC: 142 MMOL/L (ref 136–145)
TRIGL SERPL-MCNC: 71 MG/DL (ref 0–149)
VLDL: 14 MG/DL (ref 0–40)

## 2024-09-18 PROCEDURE — 99213 OFFICE O/P EST LOW 20 MIN: CPT | Performed by: STUDENT IN AN ORGANIZED HEALTH CARE EDUCATION/TRAINING PROGRAM

## 2024-09-18 PROCEDURE — 80053 COMPREHEN METABOLIC PANEL: CPT

## 2024-09-18 PROCEDURE — 3008F BODY MASS INDEX DOCD: CPT | Performed by: STUDENT IN AN ORGANIZED HEALTH CARE EDUCATION/TRAINING PROGRAM

## 2024-09-18 PROCEDURE — 1036F TOBACCO NON-USER: CPT | Performed by: STUDENT IN AN ORGANIZED HEALTH CARE EDUCATION/TRAINING PROGRAM

## 2024-09-18 PROCEDURE — 80061 LIPID PANEL: CPT

## 2024-09-18 PROCEDURE — 36415 COLL VENOUS BLD VENIPUNCTURE: CPT

## 2024-09-18 SDOH — ECONOMIC STABILITY: FOOD INSECURITY: WITHIN THE PAST 12 MONTHS, YOU WORRIED THAT YOUR FOOD WOULD RUN OUT BEFORE YOU GOT MONEY TO BUY MORE.: NEVER TRUE

## 2024-09-18 SDOH — ECONOMIC STABILITY: FOOD INSECURITY: WITHIN THE PAST 12 MONTHS, THE FOOD YOU BOUGHT JUST DIDN'T LAST AND YOU DIDN'T HAVE MONEY TO GET MORE.: NEVER TRUE

## 2024-09-18 ASSESSMENT — LIFESTYLE VARIABLES
HOW MANY STANDARD DRINKS CONTAINING ALCOHOL DO YOU HAVE ON A TYPICAL DAY: 1 OR 2
HOW OFTEN DO YOU HAVE A DRINK CONTAINING ALCOHOL: 2-4 TIMES A MONTH
AUDIT-C TOTAL SCORE: 2
SKIP TO QUESTIONS 9-10: 1
HOW OFTEN DO YOU HAVE SIX OR MORE DRINKS ON ONE OCCASION: NEVER

## 2024-09-18 ASSESSMENT — PATIENT HEALTH QUESTIONNAIRE - PHQ9
SUM OF ALL RESPONSES TO PHQ9 QUESTIONS 1 AND 2: 0
1. LITTLE INTEREST OR PLEASURE IN DOING THINGS: NOT AT ALL
2. FEELING DOWN, DEPRESSED OR HOPELESS: NOT AT ALL

## 2024-09-18 ASSESSMENT — ENCOUNTER SYMPTOMS
DEPRESSION: 0
LOSS OF SENSATION IN FEET: 0
OCCASIONAL FEELINGS OF UNSTEADINESS: 0

## 2024-09-18 ASSESSMENT — PAIN SCALES - GENERAL: PAINLEVEL: 0-NO PAIN

## 2024-09-18 NOTE — PROGRESS NOTES
"SUBJECTIVE  Patient ID: Antony Pizarro \"Raghu\" is a 64 y.o. male who presents for Follow-up.    History:  He reports longstanding sinus issues. Last had surgery 2020 with my colleague, Dr. Moore.  Reports that after surgery he continued to have symptoms which she treated with multiple different medications.  Currently working with allergy and has noted some \"marginal\" improvement with treatment of dust mite IT.  Previously had workup for possible immune deficiency which was normal.    Caught an infection July 2023. Was then treated with a string of antibiotics (now been on 5 courses of antibiotics - cephalexin, Levaquin, amoxicillin-clav; no steroids - didn't take because of active levofloxacin prescription). Not currently on any nasal sprays was previously on generic Flonase. Notes some improvement in nasal discharge while on antibiotics; then thicker discharge comes up. Had sun sensitivity with doxycycline. Irrigates frequently.     They endorse nasal congestion, nasal obstruction, nasal drainage, and facial pressure. Nasal obstruction is worse at night. Reports very poor sense of smell. They deny epistaxis. Had some dry blood while on Keflex recently. History of trauma playing football with broken nose; had reduction and septoplasty. Previously tried saline spray, saline irrigations, steroid spray (Flonase, Ryaltris), antihistamine spray, oral antihistamine (Claritin), and montelukast.     Has a history of significant GERD; gets a \"lot of regurgitation.\" Trying a new LINK procedure soon (had to cancel planned procedure secondary to the influenza).    Update 5/1/2024:  Still having drainage but thicker green mucous has not recurred for the last two weeks. Completed meropenem irrigations about a week ago.    Update 9/18/2024:  Since LINX surgery with Dr. Roth and has noted improvement in PND. Still having nasal drainage; not having thicker green mucous.  Saw dermatology for a rash around his nose; stopped " Flonase at recommendation of doctor. Didn't notice much change with stopping medication. Still using daily Allegra. Using irrigations 1-2x a day. Still using IT for dust allergy.  Feels like symptoms are worse after meals.    OBJECTIVE  Physical Exam  CONSTITUTIONAL: Well appearing male who appears stated age.  PSYCHIATRIC: Alert, appropriate mood and affect.  RESPIRATORY: Normal inspiration and expiration and chest wall expansion; no use of accessory muscles to breathe.  VOICE: Clear speech without hoarseness. No stridor nor stertor.  HEAD, FACE, AND SKIN: Symmetric facial feature.  EARS: Ear canals clear bilaterally.  Tympanic membranes intact and in neutral position.  NOSE: Nasal dorsum was midline. Anterior rhinoscopy demonstrated a septum relatively midline. Inferior turbinates were mildly hypertrophied. No obvious nasal masses, polyps, mucopurulence, nor other lesions were appreciated.  OROPHARYNX: No lesion nor mucosal abnormality. The uvula was normal appearing. The tonsils were surgically absent.  No drainage.  Mild to moderate pharyngeal cobblestoning.    Radiology  CT sinus 2019: I personally reviewed the patient's prior imaging.  This demonstrates evidence of limited sinus surgery involving the right maxillary sinus and anterior ethmoid air cells.  There was varying levels of mucosal thickening involving the paranasal sinuses.    ASSESSMENT/PLAN  Diagnoses and all orders for this visit:  Chronic pansinusitis  Allergic rhinitis due to other allergic trigger, unspecified seasonality  Nasal drainage        64 y.o. male with longstanding nasal and sinus concerns partially secondary to allergic rhinitis.    1.  Chronic/allergic rhinitis, history of chronic sinusitis, postnasal drainage  The patient reports longstanding difficulty with sinus disease.  He has a history of dust mite allergy and is currently getting immunotherapy.  However, over the last half year he has had increasing thick nasal drainage that  has not responded well to multiple rounds of antibiotics.  He also has significant GI history; now s/p LINX procedure to tighten esophageal sphincter.    Endoscopy: Evidence of prior surgery fully healed.  Questionable scarring within the right maxillary sinus.  Thicker drainage within the nasal cavity but no evidence of sinus infection.  Imaging: clear paranasal sinuses with some pooling in right max    Since last visit the patient has had multiple visits with other providers who have been concerned about persistent thicker drainage.  Imaging seem to demonstrate some pooling of secretions in the right maxillary sinus.  He is now status post a month of meropenem irrigations and he has noted improvement in his drainage.  While he still has some clear drainage his thicker mucus has resolved.  On my last endoscopy I did not appreciate accumulation of mucus within the right maxillary sinus.    Since our last visit he has continued to have relatively stable clear postnasal drainage.  He associates this somewhat with GI bloating as well as worse symptoms at night and after meals.  Given his significant GI history, I think that this may be more related to gastroesophageal reflux disease.  He reports that he is seeing Dr. Escobedo in October to discuss his symptoms.  We discussed that sinus surgeries (Evert antrostomy) would not have clear benefits, as I would anticipate he would continue to need to use his medical therapy and may continue to have nasal drainage.  For now he wishes to continue with saline irrigations and may retrial fluticasone nasal spray (stopped recently after rash along face).  We also discussed potentially switching him to Rhinocort.    Follow-up in 6 months to check on his progress.    This note was created using speech recognition transcription software. Despite proofreading, typographical errors may be present that affect the meaning of the content. Please contact my office with any questions.

## 2024-09-23 ENCOUNTER — APPOINTMENT (OUTPATIENT)
Dept: ALLERGY | Facility: CLINIC | Age: 64
End: 2024-09-23
Payer: COMMERCIAL

## 2024-09-23 DIAGNOSIS — J30.2 SEASONAL ALLERGIES: ICD-10-CM

## 2024-09-23 PROCEDURE — 95115 IMMUNOTHERAPY ONE INJECTION: CPT | Performed by: ALLERGY & IMMUNOLOGY

## 2024-09-30 ENCOUNTER — TELEPHONE (OUTPATIENT)
Dept: CARDIOLOGY | Facility: CLINIC | Age: 64
End: 2024-09-30
Payer: COMMERCIAL

## 2024-09-30 NOTE — TELEPHONE ENCOUNTER
----- Message from Silvestre Faust sent at 9/30/2024  8:01 AM EDT -----  Notify patient that labs look good.    Cholesterol is 122 with LDL of 50.  This is an excellent profile.

## 2024-10-21 ENCOUNTER — APPOINTMENT (OUTPATIENT)
Dept: ALLERGY | Facility: CLINIC | Age: 64
End: 2024-10-21
Payer: COMMERCIAL

## 2024-10-21 DIAGNOSIS — J30.2 SEASONAL ALLERGIES: ICD-10-CM

## 2024-10-21 PROCEDURE — 95115 IMMUNOTHERAPY ONE INJECTION: CPT | Performed by: ALLERGY & IMMUNOLOGY

## 2024-10-31 ENCOUNTER — APPOINTMENT (OUTPATIENT)
Dept: GASTROENTEROLOGY | Facility: CLINIC | Age: 64
End: 2024-10-31
Payer: COMMERCIAL

## 2024-11-04 NOTE — PROGRESS NOTES
"Subjective     History of Present Illness:   Antony Pizarro \"Sudhir" is a 64 y.o. male who presents to GI clinic for follow-up  He has a history of chronic GERD and underwent a laparoscopic hiatal hernia surgery with a Linx procedure has been complaining of bloating..  Bloating predates the surgery similar to in the past, has been his prolonged main symptom  Has also had problems with intermittent constipation.  He said that he has had prior imaging showing that and I did verify that he has had an x-ray and a CT scan both showing constipation.  He says that he goes every day or every other day and sometimes he feels that his belly is flatter after he goes.  This is especially in the morning.  He is significantly bothered by the symptoms of bloating.    Spitting up some greenish sputum, occasional regurgitation much less than prior to surgery  .  Occasional heartburn, takes omeprazole      Also history of Calderon's esophagus last endoscopy was prior to surgery in 2021.      Review of Systems  Review of Systems   Constitutional:  Negative for chills, fever and unexpected weight change.   HENT:  Negative for congestion and trouble swallowing.    Respiratory:  Negative for cough, shortness of breath and wheezing.    Cardiovascular:  Negative for chest pain.   Gastrointestinal:  Negative for abdominal distention, abdominal pain, constipation, diarrhea, nausea and vomiting.   Genitourinary:  Negative for difficulty urinating.   Musculoskeletal:  Negative for arthralgias and joint swelling.   Skin:  Negative for color change.   Neurological:  Negative for dizziness, speech difficulty, light-headedness and headaches.   Psychiatric/Behavioral:  Negative for confusion and sleep disturbance.        Past Medical History   has a past medical history of Calderon's esophagus, CAD (coronary artery disease), GERD (gastroesophageal reflux disease), Heart disease, Hepatic steatosis, Personal history of diseases of the blood and " blood-forming organs and certain disorders involving the immune mechanism, Personal history of other diseases of the circulatory system, Personal history of other diseases of the circulatory system, Personal history of other diseases of the digestive system, Personal history of other specified conditions, and Sinusitis.     Social History   reports that he has never smoked. He has never used smokeless tobacco. He reports current alcohol use. He reports that he does not use drugs.     Family History  family history includes Coronary artery disease in his father; Heart failure in his father; cardiac disorder in his father.     Allergies  Allergies   Allergen Reactions    Doxycycline Rash    House Dust Mite Other and Unknown    Lactase Other     Congestion, sore throat    Milk Containing Products (Dairy) Unknown    Mold Unknown    Tomato Unknown       Medications  Current Outpatient Medications   Medication Instructions    ascorbic acid, vitamin C, crystals oral    atorvastatin (LIPITOR) 20 mg, oral, Daily    cholecalciferol (Vitamin D-3) 125 MCG (5000 UT) capsule oral    fexofenadine ODT (ALLEGRA ODT) 30 mg, oral, Daily    fluticasone (Flonase) 50 mcg/actuation nasal spray 1 spray, Each Nostril, 2 times daily, Shake gently. Before first use, prime pump. After use, clean tip and replace cap.    magnesium oxide (Mag-Ox) 400 mg (241.3 mg magnesium) tablet oral    metroNIDAZOLE (Metrocream) 0.75 % cream Apply to face 2x daily    omega-3 fatty acids (FISH OIL CONCENTRATE ORAL) oral    simethicone (MYLICON) 40 mg, oral, 4 times daily PRN    ubidecarenone/vitamin E (COENZYME Q10-VITAMIN E ORAL) oral    zinc sulfate (Zincate) 220 (50 Zn) MG capsule oral       Objective   There were no vitals taken for this visit.  Physical Exam  Vitals reviewed.   Constitutional:       Appearance: Normal appearance.   Cardiovascular:      Rate and Rhythm: Normal rate and regular rhythm.      Pulses: Normal pulses.   Pulmonary:      Effort:  "Pulmonary effort is normal.      Breath sounds: Normal breath sounds.   Abdominal:      General: Abdomen is flat. Bowel sounds are normal. There is no distension.      Palpations: Abdomen is soft.      Tenderness: There is no abdominal tenderness.   Musculoskeletal:         General: Normal range of motion.   Neurological:      Mental Status: He is alert.               Assessment/Plan   Antony Pizarro \"Raghu\" is a 64 y.o. male who presents to GI clinic for bloating.    1.  History of GERD and Calderon's esophagus.  Last endoscopy 2021 and he needs a repeat 1 which we will schedule today.  He is not having significant heartburn and is only taking PPIs as needed and this is very rarely.    2...  I think the abdominal bloating is related to the constipation.  We did talk about some lifestyle modification mainly related to using kiwis every day.  Take kiwi every day and then can consider MiraLAX afterwards.  Will also do gastric emptying study to make sure that there is no gastroparesis going on because I think a lot of it is related to decreased intestinal motility.              Armond Escobedo MD         "

## 2024-11-05 ENCOUNTER — APPOINTMENT (OUTPATIENT)
Dept: GASTROENTEROLOGY | Facility: CLINIC | Age: 64
End: 2024-11-05
Payer: COMMERCIAL

## 2024-11-05 VITALS
BODY MASS INDEX: 28.33 KG/M2 | WEIGHT: 209.2 LBS | SYSTOLIC BLOOD PRESSURE: 130 MMHG | DIASTOLIC BLOOD PRESSURE: 76 MMHG | HEART RATE: 73 BPM | HEIGHT: 72 IN

## 2024-11-05 DIAGNOSIS — R14.0 BLOATING: ICD-10-CM

## 2024-11-05 DIAGNOSIS — K22.70 BARRETT'S ESOPHAGUS DETERMINED BY BIOPSY: Primary | ICD-10-CM

## 2024-11-05 DIAGNOSIS — K59.09 OTHER CONSTIPATION: ICD-10-CM

## 2024-11-05 PROCEDURE — 99214 OFFICE O/P EST MOD 30 MIN: CPT | Performed by: INTERNAL MEDICINE

## 2024-11-05 PROCEDURE — 3008F BODY MASS INDEX DOCD: CPT | Performed by: INTERNAL MEDICINE

## 2024-11-05 PROCEDURE — 1036F TOBACCO NON-USER: CPT | Performed by: INTERNAL MEDICINE

## 2024-11-05 RX ORDER — OMEPRAZOLE 40 MG/1
CAPSULE, DELAYED RELEASE ORAL EVERY 12 HOURS
COMMUNITY
Start: 2023-03-23

## 2024-11-05 ASSESSMENT — ENCOUNTER SYMPTOMS
SPEECH DIFFICULTY: 0
ARTHRALGIAS: 0
CONSTIPATION: 0
HEADACHES: 0
NAUSEA: 0
DIFFICULTY URINATING: 0
CONFUSION: 0
DIZZINESS: 0
WHEEZING: 0
LIGHT-HEADEDNESS: 0
COUGH: 0
DIARRHEA: 0
ABDOMINAL PAIN: 0
SLEEP DISTURBANCE: 0
SHORTNESS OF BREATH: 0
FEVER: 0
JOINT SWELLING: 0
VOMITING: 0
TROUBLE SWALLOWING: 0
COLOR CHANGE: 0
UNEXPECTED WEIGHT CHANGE: 0
ABDOMINAL DISTENTION: 0
CHILLS: 0

## 2024-11-06 ENCOUNTER — TELEPHONE (OUTPATIENT)
Dept: GASTROENTEROLOGY | Facility: CLINIC | Age: 64
End: 2024-11-06
Payer: COMMERCIAL

## 2024-11-18 ENCOUNTER — TELEPHONE (OUTPATIENT)
Dept: GASTROENTEROLOGY | Facility: CLINIC | Age: 64
End: 2024-11-18

## 2024-11-18 ENCOUNTER — APPOINTMENT (OUTPATIENT)
Dept: ALLERGY | Facility: CLINIC | Age: 64
End: 2024-11-18
Payer: COMMERCIAL

## 2024-11-18 DIAGNOSIS — J30.2 SEASONAL ALLERGIES: ICD-10-CM

## 2024-11-18 PROCEDURE — 95115 IMMUNOTHERAPY ONE INJECTION: CPT | Performed by: ALLERGY & IMMUNOLOGY

## 2024-11-22 ENCOUNTER — ANESTHESIA EVENT (OUTPATIENT)
Dept: GASTROENTEROLOGY | Facility: EXTERNAL LOCATION | Age: 64
End: 2024-11-22

## 2024-11-25 ENCOUNTER — HOSPITAL ENCOUNTER (OUTPATIENT)
Dept: RADIOLOGY | Facility: HOSPITAL | Age: 64
Discharge: HOME | End: 2024-11-25
Payer: COMMERCIAL

## 2024-11-25 DIAGNOSIS — R14.0 BLOATING: ICD-10-CM

## 2024-11-25 PROCEDURE — 78264 GASTRIC EMPTYING IMG STUDY: CPT | Performed by: RADIOLOGY

## 2024-11-25 PROCEDURE — 78264 GASTRIC EMPTYING IMG STUDY: CPT

## 2024-11-25 PROCEDURE — 3430000001 HC RX 343 DIAGNOSTIC RADIOPHARMACEUTICALS: Performed by: INTERNAL MEDICINE

## 2024-12-04 ENCOUNTER — APPOINTMENT (OUTPATIENT)
Dept: GASTROENTEROLOGY | Facility: EXTERNAL LOCATION | Age: 64
End: 2024-12-04
Payer: COMMERCIAL

## 2024-12-04 ENCOUNTER — ANESTHESIA (OUTPATIENT)
Dept: GASTROENTEROLOGY | Facility: EXTERNAL LOCATION | Age: 64
End: 2024-12-04

## 2024-12-04 VITALS
BODY MASS INDEX: 27.53 KG/M2 | SYSTOLIC BLOOD PRESSURE: 125 MMHG | WEIGHT: 203 LBS | DIASTOLIC BLOOD PRESSURE: 77 MMHG | HEART RATE: 63 BPM | RESPIRATION RATE: 20 BRPM | TEMPERATURE: 97.5 F | OXYGEN SATURATION: 96 %

## 2024-12-04 DIAGNOSIS — R13.10 DYSPHAGIA: ICD-10-CM

## 2024-12-04 PROCEDURE — 43239 EGD BIOPSY SINGLE/MULTIPLE: CPT | Performed by: INTERNAL MEDICINE

## 2024-12-04 RX ORDER — LIDOCAINE HYDROCHLORIDE 20 MG/ML
INJECTION, SOLUTION INFILTRATION; PERINEURAL AS NEEDED
Status: DISCONTINUED | OUTPATIENT
Start: 2024-12-04 | End: 2024-12-04

## 2024-12-04 RX ORDER — PROPOFOL 10 MG/ML
INJECTION, EMULSION INTRAVENOUS AS NEEDED
Status: DISCONTINUED | OUTPATIENT
Start: 2024-12-04 | End: 2024-12-04

## 2024-12-04 RX ORDER — SODIUM CHLORIDE 9 MG/ML
INJECTION, SOLUTION INTRAVENOUS CONTINUOUS PRN
Status: DISCONTINUED | OUTPATIENT
Start: 2024-12-04 | End: 2024-12-04

## 2024-12-04 SDOH — HEALTH STABILITY: MENTAL HEALTH: CURRENT SMOKER: 0

## 2024-12-04 ASSESSMENT — COLUMBIA-SUICIDE SEVERITY RATING SCALE - C-SSRS
2. HAVE YOU ACTUALLY HAD ANY THOUGHTS OF KILLING YOURSELF?: NO
1. IN THE PAST MONTH, HAVE YOU WISHED YOU WERE DEAD OR WISHED YOU COULD GO TO SLEEP AND NOT WAKE UP?: NO
6. HAVE YOU EVER DONE ANYTHING, STARTED TO DO ANYTHING, OR PREPARED TO DO ANYTHING TO END YOUR LIFE?: NO

## 2024-12-04 ASSESSMENT — PAIN SCALES - GENERAL
PAINLEVEL_OUTOF10: 0 - NO PAIN
PAINLEVEL_OUTOF10: 0 - NO PAIN
PAIN_LEVEL: 0
PAINLEVEL_OUTOF10: 0 - NO PAIN
PAINLEVEL_OUTOF10: 0 - NO PAIN

## 2024-12-04 ASSESSMENT — PAIN - FUNCTIONAL ASSESSMENT
PAIN_FUNCTIONAL_ASSESSMENT: 0-10

## 2024-12-04 NOTE — ANESTHESIA PREPROCEDURE EVALUATION
"Patient: Antony Pizarro \"Raghu\"    Procedure Information       Anesthesia Start Date/Time: 12/04/24 1013    Scheduled providers: Armond Escobedo MD    Procedure: EGD    Location: [facility]            Relevant Problems   Cardiac   (+) Abnormal EKG   (+) Atypical chest pain   (+) Coronary artery disease   (+) Hyperlipidemia      Pulmonary   (+) Exertional dyspnea      GI   (+) GERD (gastroesophageal reflux disease)      Hematology   (+) Chronic ITP (idiopathic thrombocytopenia) (Multi)      HEENT   (+) Acute sinusitis   (+) Chronic ethmoidal sinusitis   (+) Seasonal allergies       Clinical information reviewed:   Tobacco  Allergies  Meds   Med Hx  Surg Hx   Fam Hx  Soc Hx        NPO Detail:  NPO/Void Status  Date of Last Liquid: 12/04/24  Time of Last Liquid: 0600  Date of Last Solid: 12/03/24         Physical Exam    Airway  Mallampati: II  TM distance: >3 FB  Neck ROM: full     Cardiovascular - normal exam     Dental - normal exam     Pulmonary - normal exam     Abdominal - normal exam         Anesthesia Plan    History of general anesthesia?: yes  History of complications of general anesthesia?: no    ASA 2     MAC     The patient is not a current smoker.  Patient was previously instructed to abstain from smoking on day of procedure.  Patient did not smoke on day of procedure.    intravenous induction   Anesthetic plan and risks discussed with patient.  "

## 2024-12-04 NOTE — DISCHARGE INSTRUCTIONS

## 2024-12-04 NOTE — ANESTHESIA POSTPROCEDURE EVALUATION
"Patient: Antony Pizarro \"Raghu\"    Procedure Summary       Date: 12/04/24 Room / Location: [facility]    Anesthesia Start: 1013 Anesthesia Stop: 1026    Procedure: EGD Diagnosis: Dysphagia    Scheduled Providers: Armond Escobedo MD Responsible Provider: SHMUEL Finley    Anesthesia Type: MAC ASA Status: 2            Anesthesia Type: MAC    Vitals Value Taken Time   /70 12/04/24 1024   Temp 36.4 °C (97.5 °F) 12/04/24 1024   Pulse 63 12/04/24 1024   Resp 15 12/04/24 1024   SpO2 96 % 12/04/24 1024       Anesthesia Post Evaluation    Patient location during evaluation: PACU  Patient participation: waiting for patient participation  Level of consciousness: responsive to verbal stimuli  Pain score: 0  Pain management: adequate  Airway patency: patent  Cardiovascular status: blood pressure returned to baseline  Respiratory status: acceptable  Hydration status: acceptable  Postoperative Nausea and Vomiting: none    No notable events documented.    "

## 2024-12-04 NOTE — H&P
Procedure H&P    Patient Profile-Procedures  Name Antony Pizarro  Date of Birth 1960  MRN [id number]  Address   [address] [place] [id number] [address] [place] [address]    Primary Phone Number [phone]  Secondary Phone Number    Jeferson Franklin    Procedure(s):  Procedures: EGD  Primary contact name and number   Extended Emergency Contact Information  Primary Emergency Contact: Sarah Tee  Home Phone: [phone]  Relation: Significant Other    General Health  Weight   Vitals:    12/04/24 0942   Weight: 92.1 kg (203 lb)     BMI Body mass index is 27.53 kg/m².    Allergies  Allergies   Allergen Reactions    Doxycycline Rash    House Dust Mite Other and Unknown    Lactase Other     Congestion, sore throat    Milk Containing Products (Dairy) Unknown    Mold Unknown    Tomato Unknown       Past Medical History   Past Medical History:   Diagnosis Date    Calderon's esophagus     CAD (coronary artery disease)     GERD (gastroesophageal reflux disease)     Heart disease     Hepatic steatosis     Personal history of diseases of the blood and blood-forming organs and certain disorders involving the immune mechanism     History of ITP    Personal history of other diseases of the circulatory system     History of cardiac disorder    Personal history of other diseases of the circulatory system     History of coronary artery disease    Personal history of other diseases of the digestive system     History of gastroesophageal reflux (GERD)    Personal history of other specified conditions     History of chest pain    Sinusitis        Provider assessment  Diagnosis: GERD  Medication Reviewed - yes  Prior to Admission medications    Medication Sig Start Date End Date Taking? Authorizing Provider   atorvastatin (Lipitor) 20 mg tablet Take 1 tablet (20 mg) by mouth once daily. 8/20/24 8/20/25 Yes Silvestre Faust MD   cholecalciferol (Vitamin D-3) 125 MCG (5000 UT) capsule Take by mouth. 12/5/19  Yes  Historical Provider, MD   fexofenadine ODT (Allegra ODT) 30 mg disintegrating tablet Dissolve 1 tablet (30 mg) in the mouth once daily.   Yes Historical Provider, MD   magnesium oxide (Mag-Ox) 400 mg (241.3 mg magnesium) tablet Take by mouth. 12/5/19  Yes Historical Provider, MD   omega-3 fatty acids (FISH OIL CONCENTRATE ORAL) Take by mouth.   Yes Historical Provider, MD   zinc sulfate (Zincate) 220 (50 Zn) MG capsule Take by mouth.   Yes Historical Provider, MD   omeprazole (PriLOSEC) 40 mg DR capsule Take by mouth every 12 hours.  Patient taking differently: Take 20 mg by mouth every 12 hours. 3/23/23   Historical Provider, MD       Physical Exam  Vitals:    12/04/24 0942   BP: 136/78   Pulse: 68   Resp: 11   Temp: 36.7 °C (98.1 °F)   SpO2: 99%        General: A&Ox3, NAD.  HEENT: AT/NC.   CV: RRR. No murmur.  Resp: CTA bilaterally. No wheezing, rhonchi or rales.   GI: Soft, NT/ND. BSx4.  Extrem: No edema. Pulses intact.  Neuro: No focal deficits.   Psych: Normal mood and affect.      Procedure Plan - pre-procedural (re)assesment completed by physician:  discharge/transfer patient when discharge criteria met    ASA status 2  Mallampati score 2    Armond Escobedo MD  12/4/2024 10:14 AM

## 2024-12-05 DIAGNOSIS — J30.89 PERENNIAL ALLERGIC RHINITIS: Primary | ICD-10-CM

## 2024-12-05 PROCEDURE — 95165 ANTIGEN THERAPY SERVICES: CPT | Performed by: ALLERGY & IMMUNOLOGY

## 2024-12-16 ENCOUNTER — APPOINTMENT (OUTPATIENT)
Dept: ALLERGY | Facility: CLINIC | Age: 64
End: 2024-12-16
Payer: COMMERCIAL

## 2024-12-16 DIAGNOSIS — J30.2 SEASONAL ALLERGIES: ICD-10-CM

## 2024-12-16 PROCEDURE — 95115 IMMUNOTHERAPY ONE INJECTION: CPT | Performed by: ALLERGY & IMMUNOLOGY

## 2024-12-18 LAB
LABORATORY COMMENT REPORT: NORMAL
PATH REPORT.FINAL DX SPEC: NORMAL
PATH REPORT.GROSS SPEC: NORMAL
PATH REPORT.TOTAL CANCER: NORMAL

## 2025-01-19 PROBLEM — R10.9 ABDOMINAL PAIN: Status: RESOLVED | Noted: 2023-10-07 | Resolved: 2025-01-19

## 2025-01-19 PROBLEM — J30.2 SEASONAL ALLERGIES: Status: RESOLVED | Noted: 2024-01-29 | Resolved: 2025-01-19

## 2025-01-19 PROBLEM — R09.89 THROAT CLEARING: Status: RESOLVED | Noted: 2023-10-07 | Resolved: 2025-01-19

## 2025-01-19 PROBLEM — J34.89 RHINORRHEA: Status: RESOLVED | Noted: 2023-10-07 | Resolved: 2025-01-19

## 2025-01-19 PROBLEM — R53.83 FATIGUE: Status: RESOLVED | Noted: 2023-10-07 | Resolved: 2025-01-19

## 2025-01-20 ENCOUNTER — APPOINTMENT (OUTPATIENT)
Dept: ALLERGY | Facility: CLINIC | Age: 65
End: 2025-01-20
Payer: COMMERCIAL

## 2025-01-20 VITALS
BODY MASS INDEX: 28.89 KG/M2 | DIASTOLIC BLOOD PRESSURE: 76 MMHG | WEIGHT: 213 LBS | SYSTOLIC BLOOD PRESSURE: 140 MMHG | HEART RATE: 76 BPM | OXYGEN SATURATION: 96 %

## 2025-01-20 DIAGNOSIS — J30.9 ALLERGIC RHINITIS, UNSPECIFIED SEASONALITY, UNSPECIFIED TRIGGER: ICD-10-CM

## 2025-01-20 DIAGNOSIS — J30.2 SEASONAL ALLERGIES: ICD-10-CM

## 2025-01-20 DIAGNOSIS — J32.2 CHRONIC ETHMOIDAL SINUSITIS: Primary | ICD-10-CM

## 2025-01-20 PROCEDURE — 90732 PPSV23 VACC 2 YRS+ SUBQ/IM: CPT | Performed by: ALLERGY & IMMUNOLOGY

## 2025-01-20 PROCEDURE — 95115 IMMUNOTHERAPY ONE INJECTION: CPT | Performed by: ALLERGY & IMMUNOLOGY

## 2025-01-20 PROCEDURE — 90471 IMMUNIZATION ADMIN: CPT | Performed by: ALLERGY & IMMUNOLOGY

## 2025-01-20 PROCEDURE — 99214 OFFICE O/P EST MOD 30 MIN: CPT | Performed by: ALLERGY & IMMUNOLOGY

## 2025-01-20 RX ORDER — MONTELUKAST SODIUM 10 MG/1
10 TABLET ORAL DAILY
Qty: 30 TABLET | Refills: 5 | Status: SHIPPED | OUTPATIENT
Start: 2025-01-20 | End: 2025-07-19

## 2025-01-20 NOTE — PATIENT INSTRUCTIONS
Shot administered today.    Start Singulair at bedtime to help decrease sinus and lung inflammation.  Side effects reported vivid dreams,  mood changes as well as ear popping.  If you experience ear popping, this indicates the medication is working so please continue to take it.      If congestion improves, try to stop Allegra.    Pneumovax administered today.  Complete immune evaluation in 4-6 weeks.     Consider scheduling with Luther Avila or Dr. Noriega in [place] for immunotherapy    Follow up for shots as scheduled.  Otherwise, follow up in 1 year, unless symptoms arise sooner.

## 2025-01-20 NOTE — ASSESSMENT & PLAN NOTE
Shots are going well but he is working in [place] and unsure of where he will get his shots.  Sx are better but he has persistent congestion and intermittent drainage.  He takes Allegra daily and NeilMed and saline rinses.  Nasal sprays were ineffective for him in the past.    Shots administered today.     He will start Singulair at night.  Reviewed SE, benefits and risks.

## 2025-01-20 NOTE — PROGRESS NOTES
"  Subjective   Patient ID:   [id number]   Antony Pizarro \"Raghu\" is a 64 y.o. male who presents for Immunotherapy (Annual follow, allergy shot administered toda).    Chief Complaint   Patient presents with    Immunotherapy     Annual follow, allergy shot administered toda      Patient presents for annual F/U of allergic rhinitis.  Started IT 11-.    Since last visit, 1-16-24, patient reports shots are going well but is going to be working in [place].  He wants to know what he can do to receive his shots.  He has seen Dr. Moore and Dr. De Leon.  He denies any local reactions and he believes the shots help.  However, he still has symptoms around dust, but admits he is chronically stuffy.  His congestion worsens at night causing breathing difficulty but also experiences it in the morning.  He does experience some postnasal drainage but much less than prior.  He has dust mite linen encasements and an air purifier.    Patient continues taking Allegra daily as well as NeilMed once a day, sometimes BID, and saline spray for his nasal congestion.  He tried nasal sprays during his shots, but they did not help.      Patient stopped his omeprazole because he never really had symptoms.      Review of Systems   HENT:  Positive for congestion and postnasal drip.      Objective   /76   Pulse 76   Wt 96.6 kg (213 lb)   SpO2 96%   BMI 28.89 kg/m²      Physical Exam  Constitutional:       Appearance: Normal appearance.   HENT:      Head: Normocephalic and atraumatic.      Right Ear: External ear normal. There is no impacted cerumen.      Left Ear: External ear normal. There is no impacted cerumen.      Nose: No congestion or rhinorrhea.   Eyes:      Extraocular Movements: Extraocular movements intact.      Conjunctiva/sclera: Conjunctivae normal.      Pupils: Pupils are equal, round, and reactive to light.   Cardiovascular:      Rate and Rhythm: Normal rate and regular rhythm.      Heart sounds: No murmur heard.     No " friction rub. No gallop.   Pulmonary:      Effort: No respiratory distress.      Breath sounds: No wheezing, rhonchi or rales.   Skin:     General: Skin is warm and dry.   Neurological:      Mental Status: He is alert.   Psychiatric:         Mood and Affect: Mood normal.         Behavior: Behavior normal.     Assessment/Plan     Allergic rhinitis  Shots are going well but he is working in [place] and unsure of where he will get his shots.  Sx are better but he has persistent congestion and intermittent drainage.  He takes Allegra daily and NeilMed and saline rinses.  Nasal sprays were ineffective for him in the past.    Shots administered today.     He will start Singulair at night.  Reviewed SE, benefits and risks.        Chronic ethmoidal sinusitis  Patient has had two episodes of sinusitis of s. Pneumoniae. His antibody titers were low. He will get a pneumovax today and then have follow up antibody titers in 4-6 weeks.       By signing my name below, I, Nishant Yoderibe, attest that this documentation has been prepared under the direction and in the presence of Ramila Dwyer MD.  All medical record entries made by the Scribe were at my direction and personally dictated by me. I have reviewed the chart and agree that the record accurately reflects my personal performance of the history, physical exam, discussion and plan.

## 2025-01-21 NOTE — ASSESSMENT & PLAN NOTE
Patient has had two episodes of sinusitis of s. Pneumoniae. His antibody titers were low. He will get a pneumovax today and then have follow up antibody titers in 4-6 weeks.

## 2025-02-03 ENCOUNTER — APPOINTMENT (OUTPATIENT)
Dept: GASTROENTEROLOGY | Facility: HOSPITAL | Age: 65
End: 2025-02-03
Payer: COMMERCIAL

## 2025-02-17 ENCOUNTER — APPOINTMENT (OUTPATIENT)
Dept: ALLERGY | Facility: CLINIC | Age: 65
End: 2025-02-17
Payer: COMMERCIAL

## 2025-02-17 DIAGNOSIS — J30.2 SEASONAL ALLERGIES: ICD-10-CM

## 2025-02-17 PROCEDURE — 95115 IMMUNOTHERAPY ONE INJECTION: CPT | Performed by: ALLERGY & IMMUNOLOGY

## 2025-02-26 DIAGNOSIS — E78.5 HYPERLIPIDEMIA, UNSPECIFIED HYPERLIPIDEMIA TYPE: ICD-10-CM

## 2025-02-26 RX ORDER — ATORVASTATIN CALCIUM 20 MG/1
20 TABLET, FILM COATED ORAL DAILY
Qty: 90 TABLET | Refills: 3 | Status: SHIPPED | OUTPATIENT
Start: 2025-02-26 | End: 2026-02-26

## 2025-03-09 ENCOUNTER — TELEPHONE (OUTPATIENT)
Dept: EMERGENCY MEDICINE | Facility: HOSPITAL | Age: 65
End: 2025-03-09

## 2025-03-09 DIAGNOSIS — R30.0 DYSURIA: Primary | ICD-10-CM

## 2025-03-11 ENCOUNTER — OFFICE VISIT (OUTPATIENT)
Dept: PRIMARY CARE | Facility: CLINIC | Age: 65
End: 2025-03-11
Payer: COMMERCIAL

## 2025-03-11 VITALS
DIASTOLIC BLOOD PRESSURE: 70 MMHG | WEIGHT: 212 LBS | SYSTOLIC BLOOD PRESSURE: 132 MMHG | HEIGHT: 72 IN | BODY MASS INDEX: 28.71 KG/M2

## 2025-03-11 DIAGNOSIS — D69.3 CHRONIC ITP (IDIOPATHIC THROMBOCYTOPENIA) (MULTI): ICD-10-CM

## 2025-03-11 DIAGNOSIS — J32.2 CHRONIC ETHMOIDAL SINUSITIS: ICD-10-CM

## 2025-03-11 DIAGNOSIS — Z00.00 HEALTHCARE MAINTENANCE: Primary | ICD-10-CM

## 2025-03-11 PROCEDURE — 1036F TOBACCO NON-USER: CPT | Performed by: STUDENT IN AN ORGANIZED HEALTH CARE EDUCATION/TRAINING PROGRAM

## 2025-03-11 PROCEDURE — 3008F BODY MASS INDEX DOCD: CPT | Performed by: STUDENT IN AN ORGANIZED HEALTH CARE EDUCATION/TRAINING PROGRAM

## 2025-03-11 PROCEDURE — 99396 PREV VISIT EST AGE 40-64: CPT | Performed by: STUDENT IN AN ORGANIZED HEALTH CARE EDUCATION/TRAINING PROGRAM

## 2025-03-11 RX ORDER — AMOXICILLIN AND CLAVULANATE POTASSIUM 875; 125 MG/1; MG/1
1 TABLET, FILM COATED ORAL 2 TIMES DAILY
Qty: 20 TABLET | Refills: 0 | Status: SHIPPED | OUTPATIENT
Start: 2025-03-11 | End: 2025-03-21

## 2025-03-11 RX ORDER — UBIDECARENONE 30 MG
30 CAPSULE ORAL DAILY
COMMUNITY

## 2025-03-11 NOTE — PROGRESS NOTES
Subjective   Patient ID: Raghu Pizarro is a 65 y.o. male who presents for Establish Care (Head congestion,x 1 week/Burning and frequent urination//).    HPI     Presents to establish care    Past medical history: Chronic sinus issues, ITP, GERD, artery disease  Past surgical history: Diaphragmatic hernia repair, sinus surgery, tonsillectomy, EGD/colonoscopy  Allergies medications: Doxycycline  Social history: , wife passed away from breast cancer, has 3 kids, exercises each morning doing elliptical and weight training.  No tobacco use.  Occasional alcohol use  Family history: Heart disease    Has longstanding history of sinus issues.  Currently is finishing off a course of Augmentin for acute infection.  In the past has needed prolonged courses of antibiotics.  Does not feel like this 1 is going away.  Still having drainage including bloody discharge occasionally.  Has been a bit more fatigued over the past week    Has been going through immunotherapy with allergy immunology to dust mites    Over the last few weeks has been having a little bit more urinary frequency, previously had been getting up once at night, out times has been twice a night.  No problems with initiating stream but occasionally can have a little discomfort at the end.        Review of Systems    8 point review of systems is otherwise negative unless mentioned on HPI      Objective   /70   Ht 1.829 m (6')   Wt 96.2 kg (212 lb)   BMI 28.75 kg/m²     Physical Exam  Constitutional:       General: He is not in acute distress.  HENT:      Head: Atraumatic.      Right Ear: There is no impacted cerumen.      Left Ear: There is no impacted cerumen.      Ears:      Comments: Slight tympanic effusion on left. Right TM normal  Eyes:      Extraocular Movements: Extraocular movements intact.   Cardiovascular:      Rate and Rhythm: Normal rate and regular rhythm.      Heart sounds: No murmur heard.  Pulmonary:      Breath sounds: No wheezing.    Abdominal:      Palpations: Abdomen is soft.      Tenderness: There is no abdominal tenderness.   Musculoskeletal:         General: No swelling.   Skin:     Findings: No rash.   Psychiatric:         Mood and Affect: Mood normal.         Assessment/Plan       Ongoing sinus infection  -Extend course of Augmentin for 10 days    Potential lower urinary tract symptoms  -UA was negative, culture pending  -Discussed initiation of tamsulosin, will hold off for now and keep us updated    Coronary artery disease  -Follows with Dr. Faust  -On atorvastatin 20 mg daily    Sinus/allergies  -Follows with ENT and allergy/immunology    History of ITP  -CBC ordered    Healthcare maintenance  -Routine labs  -Colonoscopy 2023, repeat 7 years  -EGD 12/2024, biopsies did not show any evidence of Calderon's    RTC 6 months    This note was dictated by speech recognition. Minor errors in transcription may be present.

## 2025-03-12 LAB
APPEARANCE UR: CLEAR
BACTERIA UR CULT: NORMAL
BILIRUB UR QL STRIP: NEGATIVE
COLOR UR: YELLOW
GLUCOSE UR QL STRIP: NEGATIVE
HGB UR QL STRIP: NEGATIVE
KETONES UR QL STRIP: NEGATIVE
LEUKOCYTE ESTERASE UR QL STRIP: NEGATIVE
NITRITE UR QL STRIP: NEGATIVE
PH UR STRIP: 8 [PH] (ref 5–8)
PROT UR QL STRIP: NEGATIVE
SP GR UR STRIP: 1 (ref 1–1.03)

## 2025-03-18 ENCOUNTER — APPOINTMENT (OUTPATIENT)
Dept: ALLERGY | Facility: CLINIC | Age: 65
End: 2025-03-18
Payer: COMMERCIAL

## 2025-03-20 ENCOUNTER — OFFICE VISIT (OUTPATIENT)
Dept: OTOLARYNGOLOGY | Facility: CLINIC | Age: 65
End: 2025-03-20
Payer: COMMERCIAL

## 2025-03-20 VITALS
BODY MASS INDEX: 29.26 KG/M2 | WEIGHT: 216 LBS | SYSTOLIC BLOOD PRESSURE: 128 MMHG | HEART RATE: 98 BPM | HEIGHT: 72 IN | DIASTOLIC BLOOD PRESSURE: 74 MMHG | OXYGEN SATURATION: 98 %

## 2025-03-20 DIAGNOSIS — J32.4 CHRONIC PANSINUSITIS: Primary | ICD-10-CM

## 2025-03-20 DIAGNOSIS — J30.89 ALLERGIC RHINITIS DUE TO OTHER ALLERGIC TRIGGER, UNSPECIFIED SEASONALITY: ICD-10-CM

## 2025-03-20 DIAGNOSIS — J34.89 NASAL DRAINAGE: ICD-10-CM

## 2025-03-20 PROCEDURE — 1036F TOBACCO NON-USER: CPT | Performed by: STUDENT IN AN ORGANIZED HEALTH CARE EDUCATION/TRAINING PROGRAM

## 2025-03-20 PROCEDURE — 3008F BODY MASS INDEX DOCD: CPT | Performed by: STUDENT IN AN ORGANIZED HEALTH CARE EDUCATION/TRAINING PROGRAM

## 2025-03-20 PROCEDURE — 99213 OFFICE O/P EST LOW 20 MIN: CPT | Performed by: STUDENT IN AN ORGANIZED HEALTH CARE EDUCATION/TRAINING PROGRAM

## 2025-03-20 ASSESSMENT — PATIENT HEALTH QUESTIONNAIRE - PHQ9
1. LITTLE INTEREST OR PLEASURE IN DOING THINGS: NOT AT ALL
SUM OF ALL RESPONSES TO PHQ9 QUESTIONS 1 AND 2: 0
2. FEELING DOWN, DEPRESSED OR HOPELESS: NOT AT ALL

## 2025-03-20 ASSESSMENT — ENCOUNTER SYMPTOMS
LOSS OF SENSATION IN FEET: 0
DEPRESSION: 0
OCCASIONAL FEELINGS OF UNSTEADINESS: 0

## 2025-03-20 ASSESSMENT — PAIN SCALES - GENERAL: PAINLEVEL_OUTOF10: 0-NO PAIN

## 2025-03-20 NOTE — PATIENT INSTRUCTIONS
Consider seeing Dr. Finnegan in [place] if you are looking for a closer allergist.  Consider the addition of daily budesonide nasal spray to your regimen; brand-name Rhinocort.

## 2025-03-20 NOTE — PROGRESS NOTES
"SUBJECTIVE  Patient ID: Antony Pizarro \"Raghu\" is a 65 y.o. male who presents for Sinus Problem.    History:  He reports longstanding sinus issues. Last had surgery 2020 with my colleague, Dr. Moore.  Reports that after surgery he continued to have symptoms which she treated with multiple different medications.  Currently working with allergy and has noted some \"marginal\" improvement with treatment of dust mite IT.  Previously had workup for possible immune deficiency which was normal.    Caught an infection July 2023. Was then treated with a string of antibiotics (now been on 5 courses of antibiotics - cephalexin, Levaquin, amoxicillin-clav; no steroids - didn't take because of active levofloxacin prescription). Not currently on any nasal sprays was previously on generic Flonase. Notes some improvement in nasal discharge while on antibiotics; then thicker discharge comes up. Had sun sensitivity with doxycycline. Irrigates frequently.     They endorse nasal congestion, nasal obstruction, nasal drainage, and facial pressure. Nasal obstruction is worse at night. Reports very poor sense of smell. They deny epistaxis. Had some dry blood while on Keflex recently. History of trauma playing football with broken nose; had reduction and septoplasty. Previously tried saline spray, saline irrigations, steroid spray (Flonase, Ryaltris), antihistamine spray, oral antihistamine (Claritin), and montelukast.     Has a history of significant GERD; gets a \"lot of regurgitation.\" Trying a new LINK procedure soon (had to cancel planned procedure secondary to the influenza).    Update 5/1/2024:  Still having drainage but thicker green mucous has not recurred for the last two weeks. Completed meropenem irrigations about a week ago.    Update 9/18/2024:  Since LINX surgery with Dr. Roth and has noted improvement in PND. Still having nasal drainage; not having thicker green mucous.  Saw dermatology for a rash around his nose; stopped " Flonase at recommendation of doctor. Didn't notice much change with stopping medication. Still using daily Allegra. Using irrigations 1-2x a day. Still using IT for dust allergy.  Feels like symptoms are worse after meals.    Update 3/20/2025:  Reports that 3/2 he developed sinus infections; started having thick nasal mucous and discharge. Symptoms were worse on the left; had facial pressure and pain. Had general malaise that started a few weeks before this infection. Had blood in his mucous. He is currently on the second of a 10-day course of Augmentin.  He is typically irrigating with saline twice a day; increased recently with active infection. Has not been using any INCS. Takes Allegra most days.    OBJECTIVE  Physical Exam  CONSTITUTIONAL: Well appearing male who appears stated age.  PSYCHIATRIC: Alert, appropriate mood and affect.  RESPIRATORY: Normal inspiration and expiration and chest wall expansion; no use of accessory muscles to breathe.  VOICE: Clear speech without hoarseness. No stridor nor stertor.  HEAD, FACE, AND SKIN: Symmetric facial feature.  NOSE: Nasal dorsum was midline. Anterior rhinoscopy demonstrated a septum relatively midline. Inferior turbinates were mildly hypertrophied. No obvious nasal masses, polyps, mucopurulence, nor other lesions were appreciated.  OROPHARYNX: No lesion nor mucosal abnormality. The uvula was normal appearing. The tonsils were surgically absent.  No drainage.  Mild to moderate pharyngeal cobblestoning.    ASSESSMENT/PLAN  Diagnoses and all orders for this visit:  Chronic pansinusitis  Allergic rhinitis due to other allergic trigger, unspecified seasonality  Nasal drainage    65 y.o. male with longstanding nasal and sinus concerns partially secondary to allergic rhinitis.    1.  Chronic/allergic rhinitis, history of chronic sinusitis, postnasal drainage  The patient reports longstanding difficulty with sinus disease.  He has a history of dust mite allergy and is  currently getting immunotherapy.  However, on his initial presentation to me he had had increasing thick nasal drainage that had not responded well to multiple rounds of antibiotics.  He also has significant GI history; now s/p LINX procedure to tighten esophageal sphincter.    Endoscopy: Evidence of prior surgery fully healed.  Questionable scarring within the right maxillary sinus.  Thicker drainage within the nasal cavity but no evidence of sinus infection.  Imaging: clear paranasal sinuses with some pooling in right max    The patient then presented with imaging that demonstrated some pooling of secretions in the right maxillary sinus.  We trialed a month of meropenem irrigations and he has noted improvement in his drainage.  While he still has some clear drainage his thicker mucus has resolved.  On my last endoscopy I did not appreciate accumulation of mucus within the right maxillary sinus.    Since her last visit the patient has continued to get further GI workup that has been relatively benign (EGD with Dr. Escobedo reviewed; pathology benign).  He has been doing relatively well with twice daily irrigations.  He stopped fluticasone nasal spray after he developed a facial rash.  He was feeling quite good until recently when he developed what sounds like a viral sinusitis followed by superinfection.  He is now completing a 3-week course of Augmentin and has noted significant improvement in symptoms over the last several days.  We discussed potentially trialing him on a new nasal steroid spray (Rhinocort), he is thinking about pursuing this.    Follow-up in 6 months to check on his progress.    This note was created using speech recognition transcription software. Despite proofreading, typographical errors may be present that affect the meaning of the content. Please contact my office with any questions.

## 2025-03-25 ENCOUNTER — APPOINTMENT (OUTPATIENT)
Dept: ALLERGY | Facility: CLINIC | Age: 65
End: 2025-03-25
Payer: COMMERCIAL

## 2025-03-25 DIAGNOSIS — J30.2 SEASONAL ALLERGIES: ICD-10-CM

## 2025-03-25 PROCEDURE — 95115 IMMUNOTHERAPY ONE INJECTION: CPT | Performed by: ALLERGY & IMMUNOLOGY

## 2025-03-26 LAB
ALBUMIN SERPL-MCNC: 4.3 G/DL (ref 3.6–5.1)
ALP SERPL-CCNC: 77 U/L (ref 35–144)
ALT SERPL-CCNC: 34 U/L (ref 9–46)
ANION GAP SERPL CALCULATED.4IONS-SCNC: 6 MMOL/L (CALC) (ref 7–17)
AST SERPL-CCNC: 27 U/L (ref 10–35)
BILIRUB SERPL-MCNC: 1 MG/DL (ref 0.2–1.2)
BUN SERPL-MCNC: 15 MG/DL (ref 7–25)
CALCIUM SERPL-MCNC: 9 MG/DL (ref 8.6–10.3)
CHLORIDE SERPL-SCNC: 105 MMOL/L (ref 98–110)
CHOLEST SERPL-MCNC: 128 MG/DL
CHOLEST/HDLC SERPL: 2.3 (CALC)
CO2 SERPL-SCNC: 29 MMOL/L (ref 20–32)
CREAT SERPL-MCNC: 1.08 MG/DL (ref 0.7–1.35)
EGFRCR SERPLBLD CKD-EPI 2021: 77 ML/MIN/1.73M2
ERYTHROCYTE [DISTWIDTH] IN BLOOD BY AUTOMATED COUNT: 11.6 % (ref 11–15)
EST. AVERAGE GLUCOSE BLD GHB EST-MCNC: 120 MG/DL
EST. AVERAGE GLUCOSE BLD GHB EST-SCNC: 6.6 MMOL/L
GLUCOSE SERPL-MCNC: 105 MG/DL (ref 65–99)
HBA1C MFR BLD: 5.8 % OF TOTAL HGB
HCT VFR BLD AUTO: 45.8 % (ref 38.5–50)
HDLC SERPL-MCNC: 56 MG/DL
HGB BLD-MCNC: 15.2 G/DL (ref 13.2–17.1)
LDLC SERPL CALC-MCNC: 58 MG/DL (CALC)
MCH RBC QN AUTO: 31.1 PG (ref 27–33)
MCHC RBC AUTO-ENTMCNC: 33.2 G/DL (ref 32–36)
MCV RBC AUTO: 93.9 FL (ref 80–100)
NONHDLC SERPL-MCNC: 72 MG/DL (CALC)
PLATELET # BLD AUTO: 178 THOUSAND/UL (ref 140–400)
PMV BLD REES-ECKER: 13 FL (ref 7.5–12.5)
POTASSIUM SERPL-SCNC: 4.4 MMOL/L (ref 3.5–5.3)
PROT SERPL-MCNC: 6.5 G/DL (ref 6.1–8.1)
PSA SERPL-MCNC: 1.03 NG/ML
RBC # BLD AUTO: 4.88 MILLION/UL (ref 4.2–5.8)
SODIUM SERPL-SCNC: 140 MMOL/L (ref 135–146)
TRIGL SERPL-MCNC: 68 MG/DL
TSH SERPL-ACNC: 1.32 MIU/L (ref 0.4–4.5)
WBC # BLD AUTO: 4.5 THOUSAND/UL (ref 3.8–10.8)

## 2025-04-14 ENCOUNTER — APPOINTMENT (OUTPATIENT)
Dept: ALLERGY | Facility: CLINIC | Age: 65
End: 2025-04-14
Payer: COMMERCIAL

## 2025-04-16 ENCOUNTER — CLINICAL SUPPORT (OUTPATIENT)
Dept: ALLERGY | Facility: CLINIC | Age: 65
End: 2025-04-16
Payer: COMMERCIAL

## 2025-04-16 DIAGNOSIS — J30.2 SEASONAL ALLERGIES: ICD-10-CM

## 2025-04-16 PROCEDURE — 95115 IMMUNOTHERAPY ONE INJECTION: CPT | Performed by: ALLERGY & IMMUNOLOGY

## 2025-05-13 ENCOUNTER — APPOINTMENT (OUTPATIENT)
Dept: ALLERGY | Facility: CLINIC | Age: 65
End: 2025-05-13
Payer: COMMERCIAL

## 2025-05-13 DIAGNOSIS — J30.2 SEASONAL ALLERGIES: ICD-10-CM

## 2025-05-13 PROCEDURE — 95115 IMMUNOTHERAPY ONE INJECTION: CPT | Performed by: ALLERGY & IMMUNOLOGY

## 2025-06-02 DIAGNOSIS — E78.5 HYPERLIPIDEMIA, UNSPECIFIED HYPERLIPIDEMIA TYPE: ICD-10-CM

## 2025-06-03 RX ORDER — ATORVASTATIN CALCIUM 20 MG/1
20 TABLET, FILM COATED ORAL DAILY
Qty: 90 TABLET | Refills: 3 | Status: SHIPPED | OUTPATIENT
Start: 2025-06-03 | End: 2026-06-03

## 2025-06-09 ENCOUNTER — APPOINTMENT (OUTPATIENT)
Dept: ALLERGY | Facility: CLINIC | Age: 65
End: 2025-06-09
Payer: COMMERCIAL

## 2025-06-09 DIAGNOSIS — J30.2 SEASONAL ALLERGIES: ICD-10-CM

## 2025-06-09 PROCEDURE — 95115 IMMUNOTHERAPY ONE INJECTION: CPT | Performed by: ALLERGY & IMMUNOLOGY

## 2025-06-10 PROBLEM — Z98.890 POST-OPERATIVE STATE: Status: RESOLVED | Noted: 2024-03-12 | Resolved: 2025-06-10

## 2025-06-10 PROBLEM — J30.9 ALLERGIC RHINITIS: Status: RESOLVED | Noted: 2023-12-14 | Resolved: 2025-06-10

## 2025-06-10 PROBLEM — J01.90 ACUTE SINUSITIS: Status: RESOLVED | Noted: 2024-04-19 | Resolved: 2025-06-10

## 2025-06-13 ENCOUNTER — APPOINTMENT (OUTPATIENT)
Dept: CARDIOLOGY | Facility: CLINIC | Age: 65
End: 2025-06-13
Payer: COMMERCIAL

## 2025-06-13 VITALS
HEIGHT: 72 IN | DIASTOLIC BLOOD PRESSURE: 74 MMHG | BODY MASS INDEX: 28.58 KG/M2 | SYSTOLIC BLOOD PRESSURE: 124 MMHG | HEART RATE: 81 BPM | OXYGEN SATURATION: 96 % | WEIGHT: 211 LBS

## 2025-06-13 DIAGNOSIS — I25.10 CORONARY ARTERY DISEASE INVOLVING NATIVE HEART WITHOUT ANGINA PECTORIS, UNSPECIFIED VESSEL OR LESION TYPE: ICD-10-CM

## 2025-06-13 DIAGNOSIS — R94.31 ABNORMAL EKG: ICD-10-CM

## 2025-06-13 DIAGNOSIS — R06.09 EXERTIONAL DYSPNEA: Primary | ICD-10-CM

## 2025-06-13 DIAGNOSIS — E78.5 HYPERLIPIDEMIA, UNSPECIFIED HYPERLIPIDEMIA TYPE: ICD-10-CM

## 2025-06-13 PROBLEM — R07.89 ATYPICAL CHEST PAIN: Status: RESOLVED | Noted: 2024-06-14 | Resolved: 2025-06-13

## 2025-06-13 PROCEDURE — 99212 OFFICE O/P EST SF 10 MIN: CPT | Performed by: INTERNAL MEDICINE

## 2025-06-13 PROCEDURE — 1036F TOBACCO NON-USER: CPT | Performed by: INTERNAL MEDICINE

## 2025-06-13 PROCEDURE — 3008F BODY MASS INDEX DOCD: CPT | Performed by: INTERNAL MEDICINE

## 2025-06-13 PROCEDURE — 99213 OFFICE O/P EST LOW 20 MIN: CPT | Performed by: INTERNAL MEDICINE

## 2025-06-13 RX ORDER — ATORVASTATIN CALCIUM 20 MG/1
20 TABLET, FILM COATED ORAL DAILY
Qty: 90 TABLET | Refills: 3 | Status: SHIPPED | OUTPATIENT
Start: 2025-06-13 | End: 2026-06-13

## 2025-06-13 NOTE — PROGRESS NOTES
"Viky Pizarro \"Raghu\" is a 65 y.o. male.    Chief Complaint:  Follow-up coronary disease.    HPI    Over the past year he has felt well.  No symptoms of chest discomfort or chest pressure.  No increased shortness of breath.  Remains very active not only on the board but also as an .  Had a stress test last year.    Cardiac catheterization in 2005 demonstrated 40% left anterior descending, normal circumflex and normal right coronary artery with normal left ventricular function.     A review of the CT scan of the abdomen demonstrates extensive atherosclerosis in the mid left anterior descending coronary artery.  There was minimal atherosclerosis in the circumflex and right coronary artery.  This study was done in August 2023.     He has a history of ITP with platelet counts as well as 5000.  This problem has resolved.        Allergies  Medication    · No Known Drug Allergies     Family History  Father    · Family history of Coronary artery disease   · Family history of cardiac disorder (V17.49) (Z82.49)   · Family history of congestive heart failure (V17.49) (Z82.49)     Social History  Problems    · Alcohol use (V49.89) (Z78.9)   · Non-smoker (V49.89) (Z78.9)   ·  (V61.07) (Z63.4)    ROS otherwise negative for 10 systems.    Current Medications[1]     Visit Vitals  /74 (BP Location: Right arm)   Pulse 81   Ht 1.829 m (6')   Wt 95.7 kg (211 lb)   SpO2 96%   BMI 28.62 kg/m²   Smoking Status Never   BSA 2.21 m²        Objective     Constitutional:       Appearance: Not in distress.   Neck:      Vascular: JVD normal.   Pulmonary:      Breath sounds: Normal breath sounds.   Cardiovascular:      Normal rate. Regular rhythm. S1 with normal intensity. S2 with normal intensity.       Murmurs: There is no murmur.      No gallop.    Pulses:     Intact distal pulses.   Edema:     Peripheral edema absent.   Abdominal:      General: Bowel sounds are normal.   Neurological:      Mental Status: " Alert and oriented to person, place and time.         Lab Review:   Lab Results   Component Value Date     03/25/2025    K 4.4 03/25/2025     03/25/2025    CO2 29 03/25/2025    BUN 15 03/25/2025    CREATININE 1.08 03/25/2025    GLUCOSE 105 (H) 03/25/2025    CALCIUM 9.0 03/25/2025     Lab Results   Component Value Date    WBC 4.5 03/25/2025    HGB 15.2 03/25/2025    HCT 45.8 03/25/2025    MCV 93.9 03/25/2025     03/25/2025     Lab Results   Component Value Date    CHOL 128 03/25/2025    TRIG 68 03/25/2025    HDL 56 03/25/2025       Assessment:    1.  Coronary disease.  Based on cardiac catheterization done in 2000 520 years ago.  A stress test done in June at 2024 showed excellent exercise ability.  There were no EKG changes.  No chest pain reported.  Continue medical management for coronary disease.    2.  Hyperlipidemia.  Cholesterol 128, HDL 56, LDL 58.         [1]   Current Outpatient Medications   Medication Sig Dispense Refill    cholecalciferol (Vitamin D-3) 125 MCG (5000 UT) capsule Take by mouth.      co-enzyme Q-10 30 mg capsule Take 1 capsule (30 mg) by mouth once daily.      fexofenadine ODT (Allegra ODT) 30 mg disintegrating tablet Dissolve 1 tablet (30 mg) in the mouth once daily.      magnesium oxide (Mag-Ox) 400 mg (241.3 mg magnesium) tablet Take by mouth.      omeprazole (PriLOSEC) 40 mg DR capsule Take by mouth every 12 hours. (Patient taking differently: Take by mouth every 12 hours. As needed)      zinc sulfate (Zincate) 220 (50 Zn) MG capsule Take by mouth.      atorvastatin (Lipitor) 20 mg tablet Take 1 tablet (20 mg) by mouth once daily. 90 tablet 3     No current facility-administered medications for this visit.

## 2025-07-07 ENCOUNTER — APPOINTMENT (OUTPATIENT)
Dept: ALLERGY | Facility: CLINIC | Age: 65
End: 2025-07-07
Payer: COMMERCIAL

## 2025-07-07 DIAGNOSIS — J30.2 SEASONAL ALLERGIES: ICD-10-CM

## 2025-07-07 PROCEDURE — 95115 IMMUNOTHERAPY ONE INJECTION: CPT | Performed by: ALLERGY & IMMUNOLOGY

## 2025-08-04 ENCOUNTER — APPOINTMENT (OUTPATIENT)
Dept: ALLERGY | Facility: CLINIC | Age: 65
End: 2025-08-04
Payer: COMMERCIAL

## 2025-08-04 DIAGNOSIS — J30.2 SEASONAL ALLERGIES: ICD-10-CM

## 2025-08-04 PROCEDURE — 95115 IMMUNOTHERAPY ONE INJECTION: CPT | Performed by: ALLERGY & IMMUNOLOGY

## 2025-08-06 DIAGNOSIS — I25.10 CORONARY ARTERY DISEASE INVOLVING NATIVE HEART WITHOUT ANGINA PECTORIS, UNSPECIFIED VESSEL OR LESION TYPE: ICD-10-CM

## 2025-08-06 DIAGNOSIS — D69.3 CHRONIC ITP (IDIOPATHIC THROMBOCYTOPENIA) (MULTI): Primary | ICD-10-CM

## 2025-08-06 DIAGNOSIS — R35.89 POLYURIA: ICD-10-CM

## 2025-08-09 LAB
ALBUMIN SERPL-MCNC: 4.6 G/DL (ref 3.6–5.1)
ALP SERPL-CCNC: 67 U/L (ref 35–144)
ALT SERPL-CCNC: 35 U/L (ref 9–46)
ANION GAP SERPL CALCULATED.4IONS-SCNC: 9 MMOL/L (CALC) (ref 7–17)
APPEARANCE UR: CLEAR
AST SERPL-CCNC: 26 U/L (ref 10–35)
BACTERIA UR CULT: NORMAL
BILIRUB SERPL-MCNC: 1.2 MG/DL (ref 0.2–1.2)
BILIRUB UR QL STRIP: NEGATIVE
BUN SERPL-MCNC: 20 MG/DL (ref 7–25)
CALCIUM SERPL-MCNC: 9.4 MG/DL (ref 8.6–10.3)
CHLORIDE SERPL-SCNC: 103 MMOL/L (ref 98–110)
CO2 SERPL-SCNC: 30 MMOL/L (ref 20–32)
COLOR UR: YELLOW
CREAT SERPL-MCNC: 1.03 MG/DL (ref 0.7–1.35)
EGFRCR SERPLBLD CKD-EPI 2021: 81 ML/MIN/1.73M2
ERYTHROCYTE [DISTWIDTH] IN BLOOD BY AUTOMATED COUNT: 12.1 % (ref 11–15)
GLUCOSE SERPL-MCNC: 105 MG/DL (ref 65–99)
GLUCOSE UR QL STRIP: NEGATIVE
HCT VFR BLD AUTO: 47.1 % (ref 38.5–50)
HGB BLD-MCNC: 15.3 G/DL (ref 13.2–17.1)
HGB UR QL STRIP: NEGATIVE
KETONES UR QL STRIP: NEGATIVE
LEUKOCYTE ESTERASE UR QL STRIP: NEGATIVE
MCH RBC QN AUTO: 31.3 PG (ref 27–33)
MCHC RBC AUTO-ENTMCNC: 32.5 G/DL (ref 32–36)
MCV RBC AUTO: 96.3 FL (ref 80–100)
NITRITE UR QL STRIP: NEGATIVE
PH UR STRIP: 6 [PH] (ref 5–8)
PLATELET # BLD AUTO: 141 THOUSAND/UL (ref 140–400)
PMV BLD REES-ECKER: 12.5 FL (ref 7.5–12.5)
POTASSIUM SERPL-SCNC: 4 MMOL/L (ref 3.5–5.3)
PROT SERPL-MCNC: 6.7 G/DL (ref 6.1–8.1)
PROT UR QL STRIP: NEGATIVE
RBC # BLD AUTO: 4.89 MILLION/UL (ref 4.2–5.8)
SODIUM SERPL-SCNC: 142 MMOL/L (ref 135–146)
SP GR UR STRIP: 1.02 (ref 1–1.03)
WBC # BLD AUTO: 5.4 THOUSAND/UL (ref 3.8–10.8)

## 2025-09-02 ENCOUNTER — APPOINTMENT (OUTPATIENT)
Dept: ALLERGY | Facility: CLINIC | Age: 65
End: 2025-09-02
Payer: COMMERCIAL

## 2025-10-07 ENCOUNTER — APPOINTMENT (OUTPATIENT)
Dept: ALLERGY | Facility: CLINIC | Age: 65
End: 2025-10-07
Payer: COMMERCIAL

## (undated) DEVICE — LIGASURE, L-HOOK 37CM SEALER/DIVIDER LAP, MARYLAND

## (undated) DEVICE — GOWN, SURGICAL, IMPLT, BACK, DISPOSABLE, XLARGE, STERILE

## (undated) DEVICE — SUTURE, SILK, 2-0, 30 IN, SH, BLACK

## (undated) DEVICE — TUBE SET, PNEUMOCLEAR, SMOKE EVACU, HIGH-FLOW

## (undated) DEVICE — Device

## (undated) DEVICE — ASSEMBLY, STRYKER FLOW 2, SUCTION IRRIGATOR, WITH TIP

## (undated) DEVICE — SOLUTION, INJECTION, USP, SODIUM CHLORIDE 0.9%, .9, NACL, 1000 ML, BAG

## (undated) DEVICE — DRAPE, SHEET, FAN FOLDED, HALF, 44 X 58 IN, DISPOSABLE, LF, STERILE

## (undated) DEVICE — SKIN CLOSURE SYS, PREMIERPRO EXOFIN, 1-4CM X 22CM, 1.75G TUBE

## (undated) DEVICE — CARE KIT, LAPAROSCOPIC, ADVANCED

## (undated) DEVICE — SCISSOR, MINI ENDO CUT, TIPS, DISP

## (undated) DEVICE — SUTURE, CTD, VICRYL 3-0, UND, BR, SH-1

## (undated) DEVICE — SUTURE, CHROMIC, 0, 12 IN, LIGATING LOOP

## (undated) DEVICE — DRAIN, PENROSE, 0.25 X 12 IN, LF

## (undated) DEVICE — SUTURE, VICRYL, 4-0, 18 IN, PS2, UNDYED